# Patient Record
Sex: MALE | Race: WHITE | NOT HISPANIC OR LATINO | Employment: OTHER | URBAN - METROPOLITAN AREA
[De-identification: names, ages, dates, MRNs, and addresses within clinical notes are randomized per-mention and may not be internally consistent; named-entity substitution may affect disease eponyms.]

---

## 2024-08-26 ENCOUNTER — HOSPITAL ENCOUNTER (INPATIENT)
Facility: HOSPITAL | Age: 84
LOS: 4 days | Discharge: HOME HEALTH | DRG: 177 | End: 2024-08-30
Attending: EMERGENCY MEDICINE | Admitting: INTERNAL MEDICINE
Payer: MEDICARE

## 2024-08-26 ENCOUNTER — APPOINTMENT (OUTPATIENT)
Dept: RADIOLOGY | Facility: HOSPITAL | Age: 84
End: 2024-08-26
Attending: EMERGENCY MEDICINE
Payer: MEDICARE

## 2024-08-26 DIAGNOSIS — R06.02 SOB (SHORTNESS OF BREATH): ICD-10-CM

## 2024-08-26 DIAGNOSIS — R91.1 LUNG NODULE: ICD-10-CM

## 2024-08-26 DIAGNOSIS — U07.1 COVID-19 VIRUS INFECTION: Primary | ICD-10-CM

## 2024-08-26 DIAGNOSIS — R09.02 HYPOXIA: ICD-10-CM

## 2024-08-26 DIAGNOSIS — J44.1 COPD EXACERBATION: ICD-10-CM

## 2024-08-26 DIAGNOSIS — R07.9 CHEST PAIN: ICD-10-CM

## 2024-08-26 LAB
ALBUMIN SERPL-MCNC: 3.5 G/DL (ref 3.4–4.8)
ALBUMIN/GLOB SERPL: 0.9 RATIO (ref 1.1–2)
ALP SERPL-CCNC: 78 UNIT/L (ref 40–150)
ALT SERPL-CCNC: 113 UNIT/L (ref 0–55)
ANION GAP SERPL CALC-SCNC: 12 MEQ/L
AST SERPL-CCNC: 97 UNIT/L (ref 5–34)
BASOPHILS # BLD AUTO: 0.01 X10(3)/MCL
BASOPHILS NFR BLD AUTO: 0.2 %
BILIRUB SERPL-MCNC: 1 MG/DL
BNP BLD-MCNC: 69.3 PG/ML
BUN SERPL-MCNC: 16.3 MG/DL (ref 8.4–25.7)
CALCIUM SERPL-MCNC: 9.4 MG/DL (ref 8.8–10)
CHLORIDE SERPL-SCNC: 100 MMOL/L (ref 98–107)
CO2 SERPL-SCNC: 20 MMOL/L (ref 23–31)
CREAT SERPL-MCNC: 0.89 MG/DL (ref 0.73–1.18)
CREAT/UREA NIT SERPL: 18
CRP SERPL-MCNC: 67.4 MG/L
EOSINOPHIL # BLD AUTO: 0 X10(3)/MCL (ref 0–0.9)
EOSINOPHIL NFR BLD AUTO: 0 %
ERYTHROCYTE [DISTWIDTH] IN BLOOD BY AUTOMATED COUNT: 12.7 % (ref 11.5–17)
GFR SERPLBLD CREATININE-BSD FMLA CKD-EPI: >60 ML/MIN/1.73/M2
GLOBULIN SER-MCNC: 3.8 GM/DL (ref 2.4–3.5)
GLUCOSE SERPL-MCNC: 92 MG/DL (ref 82–115)
HCT VFR BLD AUTO: 44.3 % (ref 42–52)
HGB BLD-MCNC: 16 G/DL (ref 14–18)
IMM GRANULOCYTES # BLD AUTO: 0.02 X10(3)/MCL (ref 0–0.04)
IMM GRANULOCYTES NFR BLD AUTO: 0.4 %
LYMPHOCYTES # BLD AUTO: 0.67 X10(3)/MCL (ref 0.6–4.6)
LYMPHOCYTES NFR BLD AUTO: 15 %
MAGNESIUM SERPL-MCNC: 1.7 MG/DL (ref 1.6–2.6)
MCH RBC QN AUTO: 36 PG (ref 27–31)
MCHC RBC AUTO-ENTMCNC: 36.1 G/DL (ref 33–36)
MCV RBC AUTO: 99.6 FL (ref 80–94)
MONOCYTES # BLD AUTO: 0.5 X10(3)/MCL (ref 0.1–1.3)
MONOCYTES NFR BLD AUTO: 11.2 %
NEUTROPHILS # BLD AUTO: 3.26 X10(3)/MCL (ref 2.1–9.2)
NEUTROPHILS NFR BLD AUTO: 73.2 %
NRBC BLD AUTO-RTO: 0 %
PLATELET # BLD AUTO: 158 X10(3)/MCL (ref 130–400)
PMV BLD AUTO: 10.7 FL (ref 7.4–10.4)
POTASSIUM SERPL-SCNC: 4.4 MMOL/L (ref 3.5–5.1)
PROT SERPL-MCNC: 7.3 GM/DL (ref 5.8–7.6)
RBC # BLD AUTO: 4.45 X10(6)/MCL (ref 4.7–6.1)
SODIUM SERPL-SCNC: 132 MMOL/L (ref 136–145)
TROPONIN I SERPL-MCNC: 0.02 NG/ML (ref 0–0.04)
WBC # BLD AUTO: 4.46 X10(3)/MCL (ref 4.5–11.5)

## 2024-08-26 PROCEDURE — 85379 FIBRIN DEGRADATION QUANT: CPT | Performed by: INTERNAL MEDICINE

## 2024-08-26 PROCEDURE — 25000003 PHARM REV CODE 250: Performed by: EMERGENCY MEDICINE

## 2024-08-26 PROCEDURE — 25500020 PHARM REV CODE 255: Performed by: EMERGENCY MEDICINE

## 2024-08-26 PROCEDURE — 94760 N-INVAS EAR/PLS OXIMETRY 1: CPT

## 2024-08-26 PROCEDURE — 11000001 HC ACUTE MED/SURG PRIVATE ROOM

## 2024-08-26 PROCEDURE — 96374 THER/PROPH/DIAG INJ IV PUSH: CPT

## 2024-08-26 PROCEDURE — 94640 AIRWAY INHALATION TREATMENT: CPT

## 2024-08-26 PROCEDURE — 99900035 HC TECH TIME PER 15 MIN (STAT)

## 2024-08-26 PROCEDURE — 99900031 HC PATIENT EDUCATION (STAT)

## 2024-08-26 PROCEDURE — 80053 COMPREHEN METABOLIC PANEL: CPT

## 2024-08-26 PROCEDURE — 27000221 HC OXYGEN, UP TO 24 HOURS

## 2024-08-26 PROCEDURE — 93005 ELECTROCARDIOGRAM TRACING: CPT

## 2024-08-26 PROCEDURE — 86140 C-REACTIVE PROTEIN: CPT | Performed by: INTERNAL MEDICINE

## 2024-08-26 PROCEDURE — 25000242 PHARM REV CODE 250 ALT 637 W/ HCPCS: Performed by: EMERGENCY MEDICINE

## 2024-08-26 PROCEDURE — 63600175 PHARM REV CODE 636 W HCPCS: Mod: JZ,TB | Performed by: EMERGENCY MEDICINE

## 2024-08-26 PROCEDURE — 83735 ASSAY OF MAGNESIUM: CPT

## 2024-08-26 PROCEDURE — 27000207 HC ISOLATION

## 2024-08-26 PROCEDURE — 85025 COMPLETE CBC W/AUTO DIFF WBC: CPT

## 2024-08-26 PROCEDURE — 71275 CT ANGIOGRAPHY CHEST: CPT | Mod: TC

## 2024-08-26 PROCEDURE — 99285 EMERGENCY DEPT VISIT HI MDM: CPT | Mod: 25

## 2024-08-26 PROCEDURE — 25000003 PHARM REV CODE 250: Performed by: INTERNAL MEDICINE

## 2024-08-26 PROCEDURE — 93010 ELECTROCARDIOGRAM REPORT: CPT | Mod: ,,, | Performed by: INTERNAL MEDICINE

## 2024-08-26 PROCEDURE — XW033E5 INTRODUCTION OF REMDESIVIR ANTI-INFECTIVE INTO PERIPHERAL VEIN, PERCUTANEOUS APPROACH, NEW TECHNOLOGY GROUP 5: ICD-10-PCS | Performed by: EMERGENCY MEDICINE

## 2024-08-26 PROCEDURE — 83880 ASSAY OF NATRIURETIC PEPTIDE: CPT

## 2024-08-26 PROCEDURE — 84484 ASSAY OF TROPONIN QUANT: CPT

## 2024-08-26 RX ORDER — HYDROCODONE BITARTRATE AND ACETAMINOPHEN 10; 325 MG/1; MG/1
1 TABLET ORAL EVERY 8 HOURS PRN
Status: DISCONTINUED | OUTPATIENT
Start: 2024-08-26 | End: 2024-08-30 | Stop reason: HOSPADM

## 2024-08-26 RX ORDER — GUAIFENESIN AND DEXTROMETHORPHAN HYDROBROMIDE 10; 100 MG/5ML; MG/5ML
5 SYRUP ORAL EVERY 4 HOURS PRN
Status: DISCONTINUED | OUTPATIENT
Start: 2024-08-26 | End: 2024-08-30 | Stop reason: HOSPADM

## 2024-08-26 RX ORDER — ALBUTEROL SULFATE 90 UG/1
AEROSOL, METERED RESPIRATORY (INHALATION)
COMMUNITY
Start: 2024-08-21

## 2024-08-26 RX ORDER — DOXYCYCLINE HYCLATE 100 MG
100 TABLET ORAL 2 TIMES DAILY
Status: ON HOLD | COMMUNITY
Start: 2024-08-21 | End: 2024-08-30 | Stop reason: HOSPADM

## 2024-08-26 RX ORDER — POLYETHYLENE GLYCOL 3350 17 G/17G
17 POWDER, FOR SOLUTION ORAL 2 TIMES DAILY PRN
Status: DISCONTINUED | OUTPATIENT
Start: 2024-08-26 | End: 2024-08-30 | Stop reason: HOSPADM

## 2024-08-26 RX ORDER — IPRATROPIUM BROMIDE AND ALBUTEROL SULFATE 2.5; .5 MG/3ML; MG/3ML
3 SOLUTION RESPIRATORY (INHALATION) EVERY 4 HOURS
Status: DISCONTINUED | OUTPATIENT
Start: 2024-08-27 | End: 2024-08-27

## 2024-08-26 RX ORDER — SODIUM CHLORIDE 0.9 % (FLUSH) 0.9 %
10 SYRINGE (ML) INJECTION
Status: DISCONTINUED | OUTPATIENT
Start: 2024-08-26 | End: 2024-08-30 | Stop reason: HOSPADM

## 2024-08-26 RX ORDER — ALUMINUM HYDROXIDE, MAGNESIUM HYDROXIDE, AND SIMETHICONE 1200; 120; 1200 MG/30ML; MG/30ML; MG/30ML
30 SUSPENSION ORAL 4 TIMES DAILY PRN
Status: DISCONTINUED | OUTPATIENT
Start: 2024-08-26 | End: 2024-08-30 | Stop reason: HOSPADM

## 2024-08-26 RX ORDER — ALBUTEROL SULFATE 0.83 MG/ML
7.5 SOLUTION RESPIRATORY (INHALATION)
Status: COMPLETED | OUTPATIENT
Start: 2024-08-27 | End: 2024-08-27

## 2024-08-26 RX ORDER — DEXAMETHASONE SODIUM PHOSPHATE 4 MG/ML
8 INJECTION, SOLUTION INTRA-ARTICULAR; INTRALESIONAL; INTRAMUSCULAR; INTRAVENOUS; SOFT TISSUE
Status: COMPLETED | OUTPATIENT
Start: 2024-08-26 | End: 2024-08-26

## 2024-08-26 RX ORDER — PROCHLORPERAZINE EDISYLATE 5 MG/ML
5 INJECTION INTRAMUSCULAR; INTRAVENOUS EVERY 6 HOURS PRN
Status: DISCONTINUED | OUTPATIENT
Start: 2024-08-26 | End: 2024-08-30 | Stop reason: HOSPADM

## 2024-08-26 RX ORDER — HYDROCODONE BITARTRATE AND ACETAMINOPHEN 10; 325 MG/1; MG/1
1 TABLET ORAL 2 TIMES DAILY
COMMUNITY
Start: 2024-08-06

## 2024-08-26 RX ORDER — IPRATROPIUM BROMIDE AND ALBUTEROL SULFATE 2.5; .5 MG/3ML; MG/3ML
3 SOLUTION RESPIRATORY (INHALATION)
Status: COMPLETED | OUTPATIENT
Start: 2024-08-26 | End: 2024-08-26

## 2024-08-26 RX ORDER — DEXAMETHASONE SODIUM PHOSPHATE 4 MG/ML
8 INJECTION, SOLUTION INTRA-ARTICULAR; INTRALESIONAL; INTRAMUSCULAR; INTRAVENOUS; SOFT TISSUE EVERY 24 HOURS
Status: DISCONTINUED | OUTPATIENT
Start: 2024-08-27 | End: 2024-08-28

## 2024-08-26 RX ORDER — TALC
6 POWDER (GRAM) TOPICAL NIGHTLY PRN
Status: DISCONTINUED | OUTPATIENT
Start: 2024-08-26 | End: 2024-08-30 | Stop reason: HOSPADM

## 2024-08-26 RX ORDER — AMOXICILLIN 250 MG
2 CAPSULE ORAL 2 TIMES DAILY PRN
Status: DISCONTINUED | OUTPATIENT
Start: 2024-08-26 | End: 2024-08-30 | Stop reason: HOSPADM

## 2024-08-26 RX ORDER — ACETAMINOPHEN 325 MG/1
650 TABLET ORAL EVERY 4 HOURS PRN
Status: DISCONTINUED | OUTPATIENT
Start: 2024-08-26 | End: 2024-08-30 | Stop reason: HOSPADM

## 2024-08-26 RX ORDER — ONDANSETRON HYDROCHLORIDE 2 MG/ML
4 INJECTION, SOLUTION INTRAVENOUS EVERY 4 HOURS PRN
Status: DISCONTINUED | OUTPATIENT
Start: 2024-08-26 | End: 2024-08-30 | Stop reason: HOSPADM

## 2024-08-26 RX ORDER — METOPROLOL SUCCINATE 50 MG/1
100 TABLET, EXTENDED RELEASE ORAL DAILY
Status: DISCONTINUED | OUTPATIENT
Start: 2024-08-27 | End: 2024-08-30 | Stop reason: HOSPADM

## 2024-08-26 RX ORDER — PREDNISONE 20 MG/1
TABLET ORAL
Status: ON HOLD | COMMUNITY
Start: 2024-08-21 | End: 2024-08-30 | Stop reason: HOSPADM

## 2024-08-26 RX ORDER — BENZONATATE 100 MG/1
200 CAPSULE ORAL 3 TIMES DAILY PRN
Status: DISCONTINUED | OUTPATIENT
Start: 2024-08-26 | End: 2024-08-30 | Stop reason: HOSPADM

## 2024-08-26 RX ORDER — GUAIFENESIN 600 MG/1
600 TABLET, EXTENDED RELEASE ORAL 2 TIMES DAILY
Status: DISCONTINUED | OUTPATIENT
Start: 2024-08-26 | End: 2024-08-30 | Stop reason: HOSPADM

## 2024-08-26 RX ORDER — METOPROLOL SUCCINATE 100 MG/1
100 TABLET, EXTENDED RELEASE ORAL
COMMUNITY
Start: 2024-07-08

## 2024-08-26 RX ORDER — MAGNESIUM SULFATE HEPTAHYDRATE 40 MG/ML
2 INJECTION, SOLUTION INTRAVENOUS ONCE
Status: COMPLETED | OUTPATIENT
Start: 2024-08-27 | End: 2024-08-27

## 2024-08-26 RX ADMIN — GUAIFENESIN 600 MG: 600 TABLET, EXTENDED RELEASE ORAL at 11:08

## 2024-08-26 RX ADMIN — IPRATROPIUM BROMIDE AND ALBUTEROL SULFATE 3 ML: 2.5; .5 SOLUTION RESPIRATORY (INHALATION) at 11:08

## 2024-08-26 RX ADMIN — IPRATROPIUM BROMIDE AND ALBUTEROL SULFATE 3 ML: .5; 3 SOLUTION RESPIRATORY (INHALATION) at 09:08

## 2024-08-26 RX ADMIN — DEXAMETHASONE SODIUM PHOSPHATE 8 MG: 4 INJECTION, SOLUTION INTRA-ARTICULAR; INTRALESIONAL; INTRAMUSCULAR; INTRAVENOUS; SOFT TISSUE at 10:08

## 2024-08-26 RX ADMIN — IOHEXOL 100 ML: 350 INJECTION, SOLUTION INTRAVENOUS at 08:08

## 2024-08-26 RX ADMIN — REMDESIVIR 200 MG: 100 INJECTION, POWDER, LYOPHILIZED, FOR SOLUTION INTRAVENOUS at 11:08

## 2024-08-26 RX ADMIN — APIXABAN 5 MG: 5 TABLET, FILM COATED ORAL at 11:08

## 2024-08-26 NOTE — FIRST PROVIDER EVALUATION
"Medical screening examination initiated.  I have conducted a focused provider triage encounter, findings are as follows:    Brief history of present illness:  84 year old male presents to ER with c/o SOB and body aches. Patient diagnosed with COVID and Pneumonia last week    Vitals:    08/26/24 1715 08/26/24 1719   BP: 109/71    Pulse: (!) 115    Resp: (!) 24    Temp: 98 °F (36.7 °C)    TempSrc: Oral    SpO2: (!) 88% (!) 94%   Weight: 86.2 kg (190 lb)    Height: 6' 2" (1.88 m)        Pertinent physical exam:  Awake and alert, NAD    Brief workup plan:  Labs, EKG, CXR    Preliminary workup initiated; this workup will be continued and followed by the physician or advanced practice provider that is assigned to the patient when roomed.  "

## 2024-08-27 LAB
D DIMER PPP IA.FEU-MCNC: <0.27 UG/ML FEU (ref 0–0.5)
OHS QRS DURATION: 70 MS
OHS QTC CALCULATION: 395 MS
POCT GLUCOSE: 104 MG/DL (ref 70–110)

## 2024-08-27 PROCEDURE — 27000207 HC ISOLATION

## 2024-08-27 PROCEDURE — 94760 N-INVAS EAR/PLS OXIMETRY 1: CPT

## 2024-08-27 PROCEDURE — 25000242 PHARM REV CODE 250 ALT 637 W/ HCPCS: Performed by: INTERNAL MEDICINE

## 2024-08-27 PROCEDURE — 25000003 PHARM REV CODE 250: Performed by: INTERNAL MEDICINE

## 2024-08-27 PROCEDURE — 94799 UNLISTED PULMONARY SVC/PX: CPT

## 2024-08-27 PROCEDURE — 99900031 HC PATIENT EDUCATION (STAT)

## 2024-08-27 PROCEDURE — 27000221 HC OXYGEN, UP TO 24 HOURS

## 2024-08-27 PROCEDURE — 25000003 PHARM REV CODE 250: Performed by: EMERGENCY MEDICINE

## 2024-08-27 PROCEDURE — 21400001 HC TELEMETRY ROOM

## 2024-08-27 PROCEDURE — 94640 AIRWAY INHALATION TREATMENT: CPT

## 2024-08-27 PROCEDURE — 63600175 PHARM REV CODE 636 W HCPCS: Performed by: EMERGENCY MEDICINE

## 2024-08-27 PROCEDURE — 25000242 PHARM REV CODE 250 ALT 637 W/ HCPCS: Performed by: EMERGENCY MEDICINE

## 2024-08-27 PROCEDURE — 63600175 PHARM REV CODE 636 W HCPCS: Mod: JZ,TB | Performed by: EMERGENCY MEDICINE

## 2024-08-27 PROCEDURE — 99900035 HC TECH TIME PER 15 MIN (STAT)

## 2024-08-27 PROCEDURE — 82962 GLUCOSE BLOOD TEST: CPT

## 2024-08-27 PROCEDURE — 63600175 PHARM REV CODE 636 W HCPCS: Performed by: INTERNAL MEDICINE

## 2024-08-27 RX ORDER — METOPROLOL TARTRATE 1 MG/ML
5 INJECTION, SOLUTION INTRAVENOUS EVERY 5 MIN PRN
Status: DISCONTINUED | OUTPATIENT
Start: 2024-08-27 | End: 2024-08-30 | Stop reason: HOSPADM

## 2024-08-27 RX ORDER — IPRATROPIUM BROMIDE AND ALBUTEROL SULFATE 2.5; .5 MG/3ML; MG/3ML
3 SOLUTION RESPIRATORY (INHALATION)
Status: DISCONTINUED | OUTPATIENT
Start: 2024-08-27 | End: 2024-08-27

## 2024-08-27 RX ORDER — BUDESONIDE 0.5 MG/2ML
0.5 INHALANT ORAL EVERY 12 HOURS
Status: DISCONTINUED | OUTPATIENT
Start: 2024-08-27 | End: 2024-08-28

## 2024-08-27 RX ADMIN — ALBUTEROL SULFATE 5 MG: 2.5 SOLUTION RESPIRATORY (INHALATION) at 12:08

## 2024-08-27 RX ADMIN — IPRATROPIUM BROMIDE AND ALBUTEROL SULFATE 3 ML: 2.5; .5 SOLUTION RESPIRATORY (INHALATION) at 04:08

## 2024-08-27 RX ADMIN — DEXAMETHASONE SODIUM PHOSPHATE 8 MG: 4 INJECTION, SOLUTION INTRA-ARTICULAR; INTRALESIONAL; INTRAMUSCULAR; INTRAVENOUS; SOFT TISSUE at 09:08

## 2024-08-27 RX ADMIN — GUAIFENESIN AND DEXTROMETHORPHAN 5 ML: 100; 10 SYRUP ORAL at 01:08

## 2024-08-27 RX ADMIN — GUAIFENESIN 600 MG: 600 TABLET, EXTENDED RELEASE ORAL at 10:08

## 2024-08-27 RX ADMIN — MAGNESIUM SULFATE HEPTAHYDRATE 2 G: 40 INJECTION, SOLUTION INTRAVENOUS at 12:08

## 2024-08-27 RX ADMIN — BENZONATATE 200 MG: 100 CAPSULE ORAL at 10:08

## 2024-08-27 RX ADMIN — REMDESIVIR 100 MG: 100 INJECTION, POWDER, LYOPHILIZED, FOR SOLUTION INTRAVENOUS at 10:08

## 2024-08-27 RX ADMIN — Medication 6 MG: at 10:08

## 2024-08-27 RX ADMIN — GUAIFENESIN AND DEXTROMETHORPHAN 5 ML: 100; 10 SYRUP ORAL at 10:08

## 2024-08-27 RX ADMIN — IPRATROPIUM BROMIDE AND ALBUTEROL SULFATE 3 ML: 2.5; .5 SOLUTION RESPIRATORY (INHALATION) at 08:08

## 2024-08-27 RX ADMIN — METOPROLOL SUCCINATE 100 MG: 50 TABLET, EXTENDED RELEASE ORAL at 09:08

## 2024-08-27 RX ADMIN — IPRATROPIUM BROMIDE AND ALBUTEROL SULFATE 3 ML: 2.5; .5 SOLUTION RESPIRATORY (INHALATION) at 11:08

## 2024-08-27 RX ADMIN — APIXABAN 5 MG: 5 TABLET, FILM COATED ORAL at 09:08

## 2024-08-27 RX ADMIN — BUDESONIDE 0.5 MG: 0.5 INHALANT RESPIRATORY (INHALATION) at 07:08

## 2024-08-27 RX ADMIN — APIXABAN 5 MG: 5 TABLET, FILM COATED ORAL at 10:08

## 2024-08-27 RX ADMIN — GUAIFENESIN 600 MG: 600 TABLET, EXTENDED RELEASE ORAL at 09:08

## 2024-08-27 RX ADMIN — HYDROCODONE BITARTRATE AND ACETAMINOPHEN 1 TABLET: 10; 325 TABLET ORAL at 10:08

## 2024-08-27 NOTE — ED PROVIDER NOTES
Encounter Date: 8/26/2024       History     Chief Complaint   Patient presents with    Shortness of Breath     And body aches x 1 week, diagnosed with COVID/PNA at [place] last week on abx, steroids, and paxlovid     84-year-old male presents to the emergency department for evaluation worsening body aches for the last 1 week, shortness of breath.  Diagnosed with COVID-19, pneumonia at [place] last week, on antibiotics and steroids outpatient but symptoms not improving.  Family member at the bedside relates has been checking his oxygen saturation and it was 88% at home prior to arrival.  States they have been trying to get him to come to the emergency department; however, he has been stubborn and refusing to come    The history is provided by the patient and a relative.     Review of patient's allergies indicates:  No Known Allergies  Past Medical History:   Diagnosis Date    Cancer     Hypertension     Paroxysmal atrial fibrillation      History reviewed. No pertinent surgical history.  No family history on file.  Social History     Tobacco Use    Smoking status: Every Day     Current packs/day: 0.25     Types: Cigarettes    Smokeless tobacco: Never     Review of Systems   Constitutional:  Positive for fatigue. Negative for fever.   Respiratory:  Positive for cough and shortness of breath.    Cardiovascular:  Negative for leg swelling.   Gastrointestinal:  Negative for nausea and vomiting.   Musculoskeletal:  Positive for myalgias.       Physical Exam     Initial Vitals [08/26/24 1715]   BP Pulse Resp Temp SpO2   109/71 (!) 115 (!) 24 98 °F (36.7 °C) (!) 88 %      MAP       --         Physical Exam    Nursing note and vitals reviewed.  Constitutional: He appears well-developed and well-nourished. No distress.   HENT:   Head: Normocephalic and atraumatic.   Mouth/Throat: Oropharynx is clear and moist.   Eyes: No scleral icterus.   Neck: Neck supple.   Normal range of motion.  Cardiovascular:  An irregularly  irregular rhythm present.   Tachycardia present.         Pulmonary/Chest: No respiratory distress. He has wheezes. He has rhonchi.   Abdominal: Abdomen is soft. He exhibits no distension. There is no abdominal tenderness.   Musculoskeletal:      Cervical back: Normal range of motion and neck supple.     Neurological: He is alert and oriented to person, place, and time. GCS score is 15.   Skin: Skin is warm and dry.         ED Course   Procedures  Labs Reviewed   COMPREHENSIVE METABOLIC PANEL - Abnormal       Result Value    Sodium 132 (*)     Potassium 4.4      Chloride 100      CO2 20 (*)     Glucose 92      Blood Urea Nitrogen 16.3      Creatinine 0.89      Calcium 9.4      Protein Total 7.3      Albumin 3.5      Globulin 3.8 (*)     Albumin/Globulin Ratio 0.9 (*)     Bilirubin Total 1.0      ALP 78       (*)     AST 97 (*)     eGFR >60      Anion Gap 12.0      BUN/Creatinine Ratio 18     CBC WITH DIFFERENTIAL - Abnormal    WBC 4.46 (*)     RBC 4.45 (*)     Hgb 16.0      Hct 44.3      MCV 99.6 (*)     MCH 36.0 (*)     MCHC 36.1 (*)     RDW 12.7      Platelet 158      MPV 10.7 (*)     Neut % 73.2      Lymph % 15.0      Mono % 11.2      Eos % 0.0      Basophil % 0.2      Lymph # 0.67      Neut # 3.26      Mono # 0.50      Eos # 0.00      Baso # 0.01      IG# 0.02      IG% 0.4      NRBC% 0.0     B-TYPE NATRIURETIC PEPTIDE - Normal    Natriuretic Peptide 69.3     TROPONIN I - Normal    Troponin-I 0.019     MAGNESIUM - Normal    Magnesium Level 1.70     CBC W/ AUTO DIFFERENTIAL    Narrative:     The following orders were created for panel order CBC auto differential.  Procedure                               Abnormality         Status                     ---------                               -----------         ------                     CBC with Differential[[id number]]       Abnormal            Final result                 Please view results for these tests on the individual orders.   COVID/RSV/FLU A&B PCR      EKG Readings: (Independently Interpreted)   Rhythm: Atrial Fibrillation. Heart Rate: 103. Clinical Impression: Atrial Fibrillation   08/26/2024 @ 1710       Imaging Results              CTA Chest Non-Coronary (PE Studies) (Final result)  Result time 08/26/24 20:16:07      Final result by Tanner Kerns MD (08/26/24 20:16:07)                   Impression:      No evidence of pulmonary thromboembolism.    Left lower lobe bronchiectasis with some trace ground-glass opacity. Early infectious process not entirely excluded. Mild tree in bud opacity.  Follow-up may be obtained within 3 months.      Electronically signed by: Tanner Kerns  Date:    08/26/2024  Time:    20:16               Narrative:    EXAMINATION:  CTA CHEST NON CORONARY (PE STUDIES)    CLINICAL HISTORY:  Pulmonary embolism (PE) suspected, unknown D-dimer;    TECHNIQUE:  Axial images of the chest were obtained with contrast. Sagittal and coronal reconstructed images were available for review. 3-D MIP reconstructions were performed from source imaging.    Automatic dose control was utilized to reduce patient radiation dose.    DLP= 510    COMPARISON:  No prior imaging available for comparison.    FINDINGS:  PULMONARY ARTERY: No evidence of pulmonary thromboembolism.    AORTA: Normal in course and caliber.  Scattered atherosclerotic disease is noted.    THYROID GLAND: The visualized thyroid is unremarkable.    AIRWAYS: Left lower lobe bronchiectasis with some trace ground-glass opacity.  Early infectious process not entirely excluded.  Mild tree in bud opacity.    HEART: The heart is normal in size. There is no pericardial effusion.    LUNGS: There are no new masses or nodules identified. No pleural effusion or pneumothorax.    LYMPH NODES: There is no significant mediastinal, axillary or hilar lymphadenopathy.    BONES: No displaced fracture. No aggressive appearing osseous lesion identified.    UPPER ABDOMEN:  The visualized abdomen is  unremarkable.                                         X-Ray Chest AP Portable (Final result)  Result time 08/26/24 17:42:36      Final result by Marli Addison MD (08/26/24 17:42:36)                   Impression:      Lung nodule seen in the right upper lobe.  CT scan chest is recommended.      Electronically signed by: Dave Addison  Date:    08/26/2024  Time:    17:42               Narrative:    EXAMINATION:  XR CHEST AP PORTABLE    CLINICAL HISTORY:  Shortness of breath    TECHNIQUE:  Single frontal view of the chest was performed.    COMPARISON:  09/02/2014    FINDINGS:  There are a couple of a lung nodule seen in the right upper hemithorax.  The lungs are hypoaerated.  No pleural effusion is seen.  The heart is normal appearance.  The aorta is slightly ectatic.  Bones and joints show no acute abnormality.                                       Medications   dexAMETHasone injection 8 mg (has no administration in time range)   remdesivir 200 mg in 0.9% NaCl 250 mL infusion (has no administration in time range)     Followed by   remdesivir 100 mg in 0.9% NaCl 100 mL infusion (has no administration in time range)   iohexoL (OMNIPAQUE 350) injection 100 mL (100 mLs Intravenous Given 8/26/24 2008)   albuterol-ipratropium 2.5 mg-0.5 mg/3 mL nebulizer solution 3 mL (3 mLs Nebulization Given 8/26/24 2135)     Medical Decision Making  Problems Addressed:  COVID-19 virus infection: acute illness or injury  Hypoxia: acute illness or injury  Lung nodule: undiagnosed new problem with uncertain prognosis  SOB (shortness of breath): acute illness or injury    Amount and/or Complexity of Data Reviewed  Radiology: ordered.    Risk  Prescription drug management.  Decision regarding hospitalization.      ED assessment:    Mr. Han presented for evaluation of increased shortness of breath, generalized weakness in the setting of COVID-19 infection, diagnosed late last week.  Afebrile however hypoxic on ED arrival.   Extensive rhonchi and wheezing noted.  Placed on supplemental O2 with improvement.  Neb treatment ordered.    Differential diagnosis (including but not limited to):   COVID-19 virus infection, secondary bacterial pneumonia, pulmonary embolus, reactive airway disease, bronchitis, sepsis    ED management:   Chest x-ray with pulmonary nodule otherwise no acute findings.  CT scan obtained demonstrates left lower lobe bronchiectasis with some ground-glass opacity.  Laboratory studies unremarkable.  Still requiring supplemental O2 after neb treatment.  Discussed with hospitalist who accepts for admission.  Started on dexamethasone and remdesivir.    My independent radiology interpretation:   Chest x-ray: No focal infiltrate or effusion    Amount and/or Complexity of Data Reviewed  Independent historian: daughter    Summary of history:  Daughter at bedside provided majority of history as patient hard of hearing and deferred to her for questions.  External data reviewed: notes from previous ED visits  Summary of data reviewed:  Only previous emergency department visit in recent years was related to enteritis in 2020.  No visits or encounters from last several years  Risk and benefits of testing: discussed   Labs: ordered and reviewed  Radiology: ordered and independent interpretation performed (see above or ED course)  ECG/medicine tests: ordered and independent interpretation performed (see above or ED course)  Discussion of management or test interpretation with external provider(s): discussed with hospitalist physician   Summary of discussion:  Discussed with hospitalist NP who accepts for admit    Risk  Prescription drug management   Decision regarding hospitalization  Shared decision making     Critical Care  none    I, Nette Davis MD personally performed the history, PE, MDM, and procedures as documented above and agree with the scribe's documentation.              ED Course as of 08/26/24 2237   Mon Aug 26, 2024    4519 Paged hospitalist. [RB]      ED Course User Index  [RB] Megan Saldaña                           Clinical Impression:  Final diagnoses:  [R06.02] SOB (shortness of breath)  [U07.1] COVID-19 virus infection (Primary)  [R09.02] Hypoxia  [R91.1] Lung nodule          ED Disposition Condition    Admit Stable                Nette Davis MD  08/26/24 [id number]

## 2024-08-27 NOTE — NURSING
Admission documentation completed by the admit nurse. Home med rec complete. Pt did elect for meds to bed with [facility]. 4 Eyes to be completed by the admitting floor nurse.

## 2024-08-27 NOTE — PROGRESS NOTES
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      Patient was in his usual state of health until about 1 week ago started having runny nose, nonproductive cough, fatigue, lethargy, decreased appetite.  He was seen by urgent care on 08/21/2024 and diagnosed with COVID. He  was prescribed doxycycline, prednisone, and Paxlovid. His symptoms did not improve and daughter checked his pulse ox today and was in the 80s for which he was brought to the ED. He denies fever, chills, chest pain, diarrhea.     On arrival to ED he was afebrile, tachycardic, tachypneic, normotensive and saturating 88% on room air, improved to above 94% on 4 L nasal cannula..  EKG showed atrial fibrillation with CVR. Labs with WBC 4.46, hemoglobin 16.0, platelets 158, creatinine 0.89, sodium 132, CRP 67.4, normal D-dimer, normal BNP and unremarkable troponin.  CTA chest with neg PE, left lower lobe bronchiectasis with  trace ground-glass opacity, mild tree in bud opacity suggestive of infectious process. No masses, nodules or lymphadenopathy appreciated. Pt was admitted under  service for further management.       Interval Hx:   Pt was seen at bedside with daughter in the room. Currently he is resting comfortably and no distress noted. O2 via Oxymask and down to 2L/min. SpO2 94 to 95%.  Afebrile. Hemodynamics are stable     No AM labs today to review.     Case was discussed with patient's nurse and  on the floor.    Objective/physical exam:  General: In no acute distress, afebrile, O2 via Oxy mask 2L  Chest: slightly coarse sounds anteriorly with end exp wheezes  Heart: RRR, +S1,  S2, no appreciable murmur  Abdomen: Soft, nontender, BS +  MSK: Warm, no lower extremity edema, no clubbing or cyanosis  Neurologic:Awake and oriented       VITAL SIGNS: 24 HRS MIN & MAX LAST   Temp  Min: 97.2 °F (36.2 °C)  Max: 98.1 °F (36.7 °C) 97.2 °F (36.2 °C)   BP  Min: 100/70  Max: 137/86 100/70   Pulse  Min: 84  Max: 115  93   Resp  Min: 17  Max: 27 20   SpO2  Min: 88 %  Max: 100 % (!) 94 %     I have reviewed the following labs:  Recent Labs   Lab 08/26/24  1752   WBC 4.46*   RBC 4.45*   HGB 16.0   HCT 44.3   MCV 99.6*   MCH 36.0*   MCHC 36.1*   RDW 12.7      MPV 10.7*     Recent Labs   Lab 08/26/24  1752   *   K 4.4      CO2 20*   BUN 16.3   CREATININE 0.89   CALCIUM 9.4   MG 1.70   ALBUMIN 3.5   ALKPHOS 78   *   AST 97*   BILITOT 1.0     Microbiology Results (last 7 days)       ** No results found for the last 168 hours. **             See below for Radiology    Assessment/Plan:  COVID 19 virus infection and pneumonia involving left lower lobe, POA  COPD with acute exacerbation   Acute hypoxic respiratory failure due to above   Left lung bronchiectasis   Hyponatremia , mild -POA  Metabolic acidosis , POA  Elevated transaminases , POA  Elevated inflammatory markers in the setting of COVID infection   Physical deconditioning/ debility     Hx-HTN, HLD, PAF on Eliquis, GERD, OA, Opiate dependency    Plan-   Continue supplemental oxygen and wean as tolerated to keep SpO2>90%  Remdesivir therapeutic course initiated along with Decadron.   Inhaled bronchodilators and ICS   Supportive treatment with antitussive and expectorant   Pt has completed oral Doxycyline as outpatient   Home meds are reviewed and resumed   Monitor LFTs. Get Hepatitis panel  PT/OT consult     Critical care note:  Critical care diagnosis: COVID 19 infection requiring treatment   Critical care interventions: Hands-on evaluation, review of labs/radiographs/records and discussion with patient and family if  present  Critical care time spent: 35 minutes        VTE prophylaxis: Eliquis     Patient condition:  Fair     Anticipated discharge and Disposition:     TBD    All diagnosis and differential diagnosis have been reviewed; assessment and plan has been documented; I have personally reviewed the labs and test results that are presently available; I have reviewed the patients medication list; I have reviewed the consulting providers response and recommendations. I have reviewed or attempted to review medical records based upon their availability    All of the patient's questions have been  addressed and answered. Patient's is agreeable to the above stated plan. I will continue to monitor closely and make adjustments to medical management as needed.    Portions of this note dictated using EMR integrated voice recognition software, and may be subject to voice recognition errors not corrected at proofreading. Please contact writer for clarification if needed.   _____________________________________________________________________    Malnutrition Status:    Scheduled Med:   albuterol-ipratropium  3 mL Nebulization Q4H    apixaban  5 mg Oral BID    dexAMETHasone  8 mg Intravenous Daily    guaiFENesin  600 mg Oral BID    metoprolol succinate  100 mg Oral Daily    remdesivir infusion  100 mg Intravenous Q24H      Continuous Infusions:     PRN Meds:    Current Facility-Administered Medications:     acetaminophen, 650 mg, Oral, Q4H PRN    aluminum-magnesium hydroxide-simethicone, 30 mL, Oral, QID PRN    benzonatate, 200 mg, Oral, TID PRN    dextromethorphan-guaiFENesin  mg/5 ml, 5 mL, Oral, Q4H PRN    HYDROcodone-acetaminophen, 1 tablet, Oral, Q8H PRN    melatonin, 6 mg, Oral, Nightly PRN    metoprolol, 5 mg, Intravenous, Q5 Min PRN    ondansetron, 4 mg, Intravenous, Q4H PRN    polyethylene glycol, 17 g, Oral, BID PRN    prochlorperazine, 5 mg, Intravenous, Q6H PRN    senna-docusate 8.6-50 mg, 2 tablet, Oral, BID PRN     sodium chloride 0.9%, 10 mL, Intravenous, PRN         Vivien Santos MD  Department of Hospital Medicine   [facility]   08/27/2024

## 2024-08-27 NOTE — H&P
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    Patient was in his usual state of health until about 1 week ago started having runny nose, nonproductive cough, fatigue, lethargy, decreased appetite.  He was seen by urgent care on 08/21/2024 and was prescribed doxycycline, prednisone, Paxlovid.  His symptom did not improve and daughter checked his pulse ox today and was in the 80s for which he was brought to the ED. He denies fever, chills, chest pain, diarrhea.    On arrival to ED he was afebrile, tachycardic, tachypneic, normotensive and saturating 88% on room air, improved to above 94% on 4 L nasal cannula..  EKG showed atrial fibrillation with controlled ventricular response.  Labs notable for WBC 4.46, hemoglobin 16.0, platelets 158, creatinine 0.89, sodium 132, CRP 67.4, normal D-dimer, normal BNP and unremarkable troponin.  CTA chest radiology read no pulmonary embolism and there is left lower lobe bronchiectasis with some trace ground-glass opacities.  On my independent CT image interpretation there is significant bilateral emphysematous changes as well as bilateral lower lobe bronchiectatic changes and scattered ground-glass opacities    ROS   Except as documented, all other systems reviewed and negative     Past Medical History   Paroxysmal AFib on Eliquis  CAD-details unavailable  Unquantified  COPD  HTN  HLD  Hearing loss  SCC nose -resected    Surgical History   SSC of nose status post resection  Dentures    Social History     Social History     Tobacco Use    Smoking status: Every Day     Current packs/day: 0.25     Types: Cigarettes    Smokeless tobacco: Never   Substance Use Topics    Alcohol use: Not on file        Screening for social drivers of health:  Patient screened for food insecurity, housing instability, transportation needs, utility difficulties, and interpersonal safety:  []Housing or food  []Transportation needs  []Utility difficulties  []Interpersonal safety  [x]None    Family History   Reviewed and negative    Allergies   Patient has no known allergies.    Home Medications     Prior to Admission medications    Medication Sig Start Date End Date Taking? Authorizing Provider   apixaban (ELIQUIS) 5 mg Tab Take 5 mg by mouth 2 (two) times daily.   Yes Provider, Historical   doxycycline (VIBRA-TABS) 100 MG tablet Take 100 mg by mouth 2 (two) times daily. as directed. 8/21/24  Yes Provider, Historical   HYDROcodone-acetaminophen (NORCO)  mg per tablet Take 1 tablet by mouth 2 (two) times daily. 8/6/24  Yes Provider, Historical   metoprolol succinate (TOPROL-XL) 100 MG 24 hr tablet Take 100 mg by mouth. 7/8/24  Yes Provider, Historical   predniSONE (DELTASONE) 20 MG tablet TAKE 1 TABLET BY MOUTH EVERY MORNING WITH FOOD AS DIRECTED 8/21/24  Yes Provider, Historical   VENTOLIN HFA 90 mcg/actuation inhaler INHALE 2 PUFFS EVERY 4 HOURS WHILE AWAKE AS NEEDED FOR SHORTNESS OF BREATH OR COUGHING 8/21/24  Yes Provider, Historical        Physical Exam   Vital Signs  Temp:  [98 °F (36.7 °C)]   Pulse:  []   Resp:  [20-27]   BP: (109-133)/(71-88)   SpO2:  [88 %-97 %]    General: Appears comfortable  HEENT: NC/AT, dry skin and oral mucosa  Neck:  No JVD  Chest:  Tachy rate air entry bilaterally with diffuse expiratory wheezing as well as some rhonchi  CVS: Irregular rhythm. Normal S1/S2.  No  pedal edema  Abdomen: nondistended, normoactive BS, soft and non-tender.  MSK: No obvious deformity or joint swelling  Skin: Warm and dry  Neuro:  Arousable, oriented to self, person place and time, following commands, no focal extremity weakness appreciated  Psych: Cooperative    Labs     Recent Labs     08/26/24  1752   WBC 4.46*   RBC 4.45*   HGB 16.0   HCT 44.3   MCV 99.6*   MCH 36.0*   MCHC 36.1*   RDW 12.7        Recent Labs     08/26/24  1752   CRP 67.40*     Recent Labs     08/26/24  2302   D-DIMER <0.27      Recent Labs     08/26/24  1752   *   K 4.4   CO2 20*   BUN 16.3   CREATININE 0.89   EGFRNORACEVR >60   GLUCOSE 92   CALCIUM 9.4   MG 1.70   ALBUMIN 3.5   GLOBULIN 3.8*   ALKPHOS 78   *   AST 97*   BILITOT 1.0   BNP 69.3     Recent Labs     08/26/24  1752   TROPONINI 0.019           Imaging     CTA Chest Non-Coronary (PE Studies)   Final Result      No evidence of pulmonary thromboembolism.      Left lower lobe bronchiectasis with some trace ground-glass opacity. Early infectious process not entirely excluded. Mild tree in bud opacity.  Follow-up may be obtained within 3 months.         Electronically signed by: Tanner Kerns   Date:    08/26/2024   Time:    20:16      X-Ray Chest AP Portable   Final Result      Lung nodule seen in the right upper lobe.  CT scan chest is recommended.         Electronically signed by: Dave Addison   Date:    08/26/2024   Time:    17:42          Assessment   Acute on chronic hypoxemic respiratory failure  Unquantified COPD with acute exacerbation  COVID-19 pneumonitis  Paroxysmal AFib-CVR  Physical deconditioning    History of active chronic tobacco smoking, HTN, HLD, GERD, hearing loss, osteoarthritis/chronic pain/opiate dependent-Norco 10 b.i.d.    Plan   Airborne isolation  Currently maintaining oxygenation with nasal cannula with reasonable work of breathing  Remdesivir for 5 days   Dexamethasone 8 mg IV daily  DuoNeb q.4 hours  Mucinex b.i.d. and  PRN antitussives  Resume metoprolol succinate 100 mg daily  VTE Prophylaxis:  Resume Eliquis 5 mg b.i.d.     Critical care time > 35 minutes  Critical care diagnosis:  Acute on chronic hypoxemic respiratory failure secondary to CBD exacerbation and COVID-19 pneumonitis    Alicia Maria MD  Internal Medicine  8/26/2024. at 10:54 PM.

## 2024-08-28 LAB
ALBUMIN SERPL-MCNC: 2.9 G/DL (ref 3.4–4.8)
ALBUMIN/GLOB SERPL: 1 RATIO (ref 1.1–2)
ALP SERPL-CCNC: 70 UNIT/L (ref 40–150)
ALT SERPL-CCNC: 103 UNIT/L (ref 0–55)
ANION GAP SERPL CALC-SCNC: 10 MEQ/L
AST SERPL-CCNC: 82 UNIT/L (ref 5–34)
B PERT.PT PRMT NPH QL NAA+NON-PROBE: NOT DETECTED
BASOPHILS # BLD AUTO: 0.01 X10(3)/MCL
BASOPHILS NFR BLD AUTO: 0.1 %
BILIRUB SERPL-MCNC: 0.9 MG/DL
BUN SERPL-MCNC: 19 MG/DL (ref 8.4–25.7)
C PNEUM DNA NPH QL NAA+NON-PROBE: NOT DETECTED
CALCIUM SERPL-MCNC: 8.2 MG/DL (ref 8.8–10)
CHLORIDE SERPL-SCNC: 102 MMOL/L (ref 98–107)
CO2 SERPL-SCNC: 21 MMOL/L (ref 23–31)
CREAT SERPL-MCNC: 0.76 MG/DL (ref 0.73–1.18)
CREAT/UREA NIT SERPL: 25
EOSINOPHIL # BLD AUTO: 0 X10(3)/MCL (ref 0–0.9)
EOSINOPHIL NFR BLD AUTO: 0 %
ERYTHROCYTE [DISTWIDTH] IN BLOOD BY AUTOMATED COUNT: 12.3 % (ref 11.5–17)
FLUAV AG UPPER RESP QL IA.RAPID: NOT DETECTED
FLUBV AG UPPER RESP QL IA.RAPID: NOT DETECTED
GFR SERPLBLD CREATININE-BSD FMLA CKD-EPI: >60 ML/MIN/1.73/M2
GLOBULIN SER-MCNC: 2.8 GM/DL (ref 2.4–3.5)
GLUCOSE SERPL-MCNC: 146 MG/DL (ref 82–115)
HADV DNA NPH QL NAA+NON-PROBE: NOT DETECTED
HAV IGM SERPL QL IA: NONREACTIVE
HBV CORE IGM SERPL QL IA: NONREACTIVE
HBV SURFACE AG SERPL QL IA: NONREACTIVE
HCOV 229E RNA NPH QL NAA+NON-PROBE: NOT DETECTED
HCOV HKU1 RNA NPH QL NAA+NON-PROBE: NOT DETECTED
HCOV NL63 RNA NPH QL NAA+NON-PROBE: NOT DETECTED
HCOV OC43 RNA NPH QL NAA+NON-PROBE: NOT DETECTED
HCT VFR BLD AUTO: 39.6 % (ref 42–52)
HCV AB SERPL QL IA: NONREACTIVE
HGB BLD-MCNC: 14.4 G/DL (ref 14–18)
HMPV RNA NPH QL NAA+NON-PROBE: NOT DETECTED
HPIV1 RNA NPH QL NAA+NON-PROBE: NOT DETECTED
HPIV2 RNA NPH QL NAA+NON-PROBE: NOT DETECTED
HPIV3 RNA NPH QL NAA+NON-PROBE: NOT DETECTED
HPIV4 RNA NPH QL NAA+NON-PROBE: NOT DETECTED
IMM GRANULOCYTES # BLD AUTO: 0.04 X10(3)/MCL (ref 0–0.04)
IMM GRANULOCYTES NFR BLD AUTO: 0.6 %
LYMPHOCYTES # BLD AUTO: 0.75 X10(3)/MCL (ref 0.6–4.6)
LYMPHOCYTES NFR BLD AUTO: 10.5 %
M PNEUMO DNA NPH QL NAA+NON-PROBE: NOT DETECTED
MAGNESIUM SERPL-MCNC: 2.2 MG/DL (ref 1.6–2.6)
MCH RBC QN AUTO: 35.6 PG (ref 27–31)
MCHC RBC AUTO-ENTMCNC: 36.4 G/DL (ref 33–36)
MCV RBC AUTO: 97.8 FL (ref 80–94)
MONOCYTES # BLD AUTO: 0.68 X10(3)/MCL (ref 0.1–1.3)
MONOCYTES NFR BLD AUTO: 9.6 %
NEUTROPHILS # BLD AUTO: 5.63 X10(3)/MCL (ref 2.1–9.2)
NEUTROPHILS NFR BLD AUTO: 79.2 %
NRBC BLD AUTO-RTO: 0 %
PHOSPHATE SERPL-MCNC: 3.3 MG/DL (ref 2.3–4.7)
PLATELET # BLD AUTO: 153 X10(3)/MCL (ref 130–400)
PMV BLD AUTO: 10.2 FL (ref 7.4–10.4)
POTASSIUM SERPL-SCNC: 4.6 MMOL/L (ref 3.5–5.1)
PROT SERPL-MCNC: 5.7 GM/DL (ref 5.8–7.6)
RBC # BLD AUTO: 4.05 X10(6)/MCL (ref 4.7–6.1)
RSV A 5' UTR RNA NPH QL NAA+PROBE: NOT DETECTED
RSV RNA NPH QL NAA+NON-PROBE: NOT DETECTED
RV+EV RNA NPH QL NAA+NON-PROBE: NOT DETECTED
SARS-COV-2 RNA RESP QL NAA+PROBE: DETECTED
SODIUM SERPL-SCNC: 133 MMOL/L (ref 136–145)
WBC # BLD AUTO: 7.11 X10(3)/MCL (ref 4.5–11.5)

## 2024-08-28 PROCEDURE — 27000221 HC OXYGEN, UP TO 24 HOURS

## 2024-08-28 PROCEDURE — 97161 PT EVAL LOW COMPLEX 20 MIN: CPT

## 2024-08-28 PROCEDURE — 63600175 PHARM REV CODE 636 W HCPCS: Mod: JZ,TB | Performed by: EMERGENCY MEDICINE

## 2024-08-28 PROCEDURE — 83735 ASSAY OF MAGNESIUM: CPT | Performed by: INTERNAL MEDICINE

## 2024-08-28 PROCEDURE — 87798 DETECT AGENT NOS DNA AMP: CPT | Performed by: INTERNAL MEDICINE

## 2024-08-28 PROCEDURE — 21400001 HC TELEMETRY ROOM

## 2024-08-28 PROCEDURE — 97166 OT EVAL MOD COMPLEX 45 MIN: CPT

## 2024-08-28 PROCEDURE — 25000003 PHARM REV CODE 250: Performed by: EMERGENCY MEDICINE

## 2024-08-28 PROCEDURE — 27000207 HC ISOLATION

## 2024-08-28 PROCEDURE — 25000003 PHARM REV CODE 250: Performed by: INTERNAL MEDICINE

## 2024-08-28 PROCEDURE — 94640 AIRWAY INHALATION TREATMENT: CPT

## 2024-08-28 PROCEDURE — 85025 COMPLETE CBC W/AUTO DIFF WBC: CPT | Performed by: INTERNAL MEDICINE

## 2024-08-28 PROCEDURE — 63600175 PHARM REV CODE 636 W HCPCS: Performed by: INTERNAL MEDICINE

## 2024-08-28 PROCEDURE — 0241U COVID/RSV/FLU A&B PCR: CPT | Performed by: EMERGENCY MEDICINE

## 2024-08-28 PROCEDURE — 25000242 PHARM REV CODE 250 ALT 637 W/ HCPCS: Performed by: INTERNAL MEDICINE

## 2024-08-28 PROCEDURE — 84100 ASSAY OF PHOSPHORUS: CPT | Performed by: INTERNAL MEDICINE

## 2024-08-28 PROCEDURE — 80074 ACUTE HEPATITIS PANEL: CPT | Performed by: INTERNAL MEDICINE

## 2024-08-28 PROCEDURE — 36415 COLL VENOUS BLD VENIPUNCTURE: CPT | Performed by: INTERNAL MEDICINE

## 2024-08-28 PROCEDURE — 99900035 HC TECH TIME PER 15 MIN (STAT)

## 2024-08-28 PROCEDURE — 80053 COMPREHEN METABOLIC PANEL: CPT | Performed by: INTERNAL MEDICINE

## 2024-08-28 PROCEDURE — 99900031 HC PATIENT EDUCATION (STAT)

## 2024-08-28 PROCEDURE — 94760 N-INVAS EAR/PLS OXIMETRY 1: CPT

## 2024-08-28 PROCEDURE — 87581 M.PNEUMON DNA AMP PROBE: CPT | Performed by: INTERNAL MEDICINE

## 2024-08-28 RX ORDER — FLUTICASONE FUROATE AND VILANTEROL 100; 25 UG/1; UG/1
1 POWDER RESPIRATORY (INHALATION) DAILY
Status: DISCONTINUED | OUTPATIENT
Start: 2024-08-28 | End: 2024-08-30 | Stop reason: HOSPADM

## 2024-08-28 RX ORDER — MUPIROCIN 20 MG/G
OINTMENT TOPICAL 2 TIMES DAILY
Status: DISCONTINUED | OUTPATIENT
Start: 2024-08-29 | End: 2024-08-30 | Stop reason: HOSPADM

## 2024-08-28 RX ADMIN — TIOTROPIUM BROMIDE INHALATION SPRAY 2 PUFF: 3.12 SPRAY, METERED RESPIRATORY (INHALATION) at 09:08

## 2024-08-28 RX ADMIN — GUAIFENESIN AND DEXTROMETHORPHAN 5 ML: 100; 10 SYRUP ORAL at 11:08

## 2024-08-28 RX ADMIN — APIXABAN 5 MG: 5 TABLET, FILM COATED ORAL at 08:08

## 2024-08-28 RX ADMIN — METOPROLOL SUCCINATE 100 MG: 50 TABLET, EXTENDED RELEASE ORAL at 09:08

## 2024-08-28 RX ADMIN — DEXAMETHASONE SODIUM PHOSPHATE 8 MG: 4 INJECTION, SOLUTION INTRA-ARTICULAR; INTRALESIONAL; INTRAMUSCULAR; INTRAVENOUS; SOFT TISSUE at 09:08

## 2024-08-28 RX ADMIN — REMDESIVIR 100 MG: 100 INJECTION, POWDER, LYOPHILIZED, FOR SOLUTION INTRAVENOUS at 09:08

## 2024-08-28 RX ADMIN — BUDESONIDE 0.5 MG: 0.5 INHALANT RESPIRATORY (INHALATION) at 07:08

## 2024-08-28 RX ADMIN — GUAIFENESIN 600 MG: 600 TABLET, EXTENDED RELEASE ORAL at 08:08

## 2024-08-28 RX ADMIN — APIXABAN 5 MG: 5 TABLET, FILM COATED ORAL at 09:08

## 2024-08-28 RX ADMIN — GUAIFENESIN 600 MG: 600 TABLET, EXTENDED RELEASE ORAL at 09:08

## 2024-08-28 NOTE — NURSING
Nurses Note -- 4 Eyes      8/28/2024   10:10 AM      Skin assessed during: Q Shift Change      [x] No Altered Skin Integrity Present    []Prevention Measures Documented      [] Yes- Altered Skin Integrity Present or Discovered   [] LDA Added if Not in Epic (Describe Wound)   [] New Altered Skin Integrity was Present on Admit and Documented in LDA   [] Wound Image Taken    Wound Care Consulted? No    Attending Nurse:  Shelbie Blancas RN/Staff Member:   Jenny

## 2024-08-28 NOTE — PLAN OF CARE
Assessment completed with daughter via phone due to contact precautions. Patient lives alone in a single story home. Patient's daughter lives a mile away. Patient was independent prior to admission and driving self. Family interested in home health at discharge. Choice list sent via [employer]. [facility], [facility]: [facility]       08/28/24 1012   Discharge Assessment   Assessment Type Discharge Planning Assessment   Confirmed/corrected address, phone number and insurance Yes   Confirmed Demographics Correct on Facesheet   Source of Information family  (Mayur (daughter))   Reason For Admission And body aches x 1 week, diagnosed with COVID/PNA at [place] last week on abx, steroids, and paxlovid   People in Home alone   Do you expect to return to your current living situation? Yes   Do you have help at home or someone to help you manage your care at home? Yes   Who are your caregiver(s) and their phone number(s)? Mayur (daughter) [phone]   Prior to hospitilization cognitive status: Alert/Oriented   Current cognitive status: Alert/Oriented   Walking or Climbing Stairs Difficulty no   Dressing/Bathing Difficulty no   Home Accessibility stairs to enter home   Number of Stairs, Main Entrance three   Stair Railings, Main Entrance railings safe and in good condition   Home Layout Able to live on 1st floor   Equipment Currently Used at Home none   Readmission within 30 days? No   Patient currently being followed by outpatient case management? No   Do you currently have service(s) that help you manage your care at home? No   Do you take prescription medications? Yes   Do you have prescription coverage? Yes   Coverage Medicare/[facility]   Do you have any problems affording any of your prescribed medications? No   Is the patient taking medications as prescribed? yes   How do you get to doctors appointments? car, drives self;family or friend will provide   Are you on dialysis? No   Do you take coumadin? No    Discharge Plan A Home Health   Discharge Plan B Home with family   DME Needed Upon Discharge  other (see comments)  (TBD)   Discharge Plan discussed with: Adult children   Transition of Care Barriers None

## 2024-08-28 NOTE — PT/OT/SLP EVAL
Occupational Therapy  Evaluation    Name: Mark Villar  MRN: [id number]  Admitting Diagnosis: COVID   Recent Surgery: * No surgery found *      Recommendations:     Discharge therapy intensity: Low Intensity Therapy   Discharge Equipment Recommendations:  walker, rolling  Barriers to discharge:  Other (Comment) (ongoing medical needs)    Assessment:     Mark Villar is a 84 y.o. male with a medical diagnosis of COPD exacerbation, COVID, acute hypoxic respiratory failure, L lung bronchiectasis.  He presents with the following performance deficits affecting function: weakness, impaired self care skills, impaired functional mobility, impaired endurance, impaired cardiopulmonary response to activity.     Pt tolerated evaluation well. Required SBA/CGA to perform functional mobility around room, ambulated ~100ft with RW. Pt lives alone and was independent prior. Would benefit from low-intensity therapy upon dc.    Rehab Prognosis: Good; patient would benefit from acute skilled OT services to address these deficits and reach maximum level of function.       Plan:     Patient to be seen 3 x/week to address the above listed problems via self-care/home management, therapeutic activities, therapeutic exercises  Plan of Care Expires: 09/18/24  Plan of Care Reviewed with: patient, daughter    Subjective     Chief Complaint: none  Patient/Family Comments/goals: return home    Occupational Profile:  Living Environment: pt lives in [facility] with 3STE; tub/shower combo with grab bars but no bench  Previous level of function: independent  Roles and Routines: father, friend  Equipment Used at Home: none  Assistance upon Discharge: pt will have some assistance from family upon dc.    Pain/Comfort:  Pain Rating 1: 0/10    Patients cultural, spiritual, Episcopalian conflicts given the current situation: no    Objective:     OT communicated with nursing prior to session.      Patient was found up in chair with oxygen, telemetry,  pulse ox (continuous) upon OT entry to room.    General Precautions: Standard, fall, airborne, contact, droplet  Orthopedic Precautions: N/A  Braces: N/A    Vital Signs: Sp02: 91% and above on 6L/min via Oxymask during mobility    Bed Mobility:    Patient completed Sit to Supine with stand by assistance    Functional Mobility/Transfers:  Patient completed Sit <> Stand Transfer with stand by assistance  with  rolling walker   Patient completed Bed <> Chair Transfer using Step Transfer technique with stand by assistance with rolling walker  Functional Mobility: Ambulated ~100ft around room with RW. Pt reports feeling safer with RW at this time.     Activities of Daily Living:  Grooming: independence    Toileting: stand by assistance      AMPA 6 Click ADL:  AMPA Total Score: 22    Functional Cognition:  Intact    Visual Perceptual Skills:  Intact    Upper Extremity Function:  Right Upper Extremity:   WFL    Left Upper Extremity:  WFL    Balance:   CGA dynamic standing     Therapeutic Positioning  Risk for acquired pressure injuries is decreased due to ability to get to BSC/toilet with assist.    OT interventions performed during the course of today's session:   Education was provided on benefits of and recommendations for therapeutic positioning    Skin assessment: all bony prominences were assessed    Findings: no redness or breakdown noted    OT recommendations for therapeutic positioning throughout hospitalization:   Follow [facility] Pressure Injury Prevention Protocol    Patient Education:  Patient and daughter/s were provided with verbal education education regarding OT role/goals/POC, fall prevention, safety awareness, and Discharge/DME recommendations.  Understanding was verbalized.     Patient left HOB elevated with call button in reach and daughter present.    GOALS:   Multidisciplinary Problems       Occupational Therapy Goals          Problem: Occupational Therapy    Goal Priority Disciplines Outcome  Interventions   Occupational Therapy Goal     OT, PT/OT Progressing    Description: Goals to be met by 9/28/2024    Pt will complete grooming standing at sink with LRAD with MOD I.  Pt will complete UB dressing with MOD I.  Pt will complete LB dressing with MOD I using LRAD.  Pt will complete toileting with MOD I using LRAD.  Pt will complete functional mobility to/from toilet and toilet transfer with MOD I using LRAD.  Pt will complete tub transfer with MOD I using LRAD.                         History:     Past Medical History:   Diagnosis Date    Cancer     Hypertension     Paroxysmal atrial fibrillation        History reviewed. No pertinent surgical history.    Time Tracking:     OT Date of Treatment:    OT Start Time: 1425  OT Stop Time: 1455  OT Total Time (min): 30 min    Billable Minutes:Evaluation moderate complexity    8/28/2024

## 2024-08-28 NOTE — PT/OT/SLP EVAL
Physical Therapy Evaluation    Patient Name:  Mark Villar   MRN:  [id number]    Recommendations:     Discharge therapy intensity: Low Intensity Therapy   Discharge Equipment Recommendations: walker, rolling   Barriers to discharge: Impaired mobility and Ongoing medical needs    Assessment:     Mark Villar is a 84 y.o. male admitted with a medical diagnosis of COPD exacerbation, COVID, acute hypoxic respiratory failure, L lung bronchiectasis.  He presents with the following impairments/functional limitations: impaired cardiopulmonary response to activity   Pt with good tolerance to PT session. He is SBA for functional mobility, ambulating 50' around room with RW. Vitals WNL throughout session. Recommending low intensity PT upon d/c to maximize functional mobility and independence as pt lives alone and is deconditioned compared to baseline.    Rehab Prognosis: Good; patient would benefit from acute skilled PT services to address these deficits and reach maximum level of function.    Recent Surgery: * No surgery found *      Plan:     During this hospitalization, patient would benefit from acute PT services 3 x/week to address the identified rehab impairments via gait training, therapeutic activities, therapeutic exercises, neuromuscular re-education and progress toward the following goals:    Plan of Care Expires:  09/28/24    Subjective     Chief Complaint: SOB  Patient/Family Comments/goals: to go home  Pain/Comfort:  Pain Rating 1: 0/10    Patients cultural, spiritual, Orthodox conflicts given the current situation: no    Living Environment:  Pt lives alone in [place] house with 3 steps to enter.   Prior to admission, patients level of function was independent with ambulation and all ADLs.  Equipment used at home: none.  DME owned (not currently used): none.  Upon discharge, patient will have assistance from daughter, who lives nearby.    Objective:     Communicated with NSG prior to session.  Patient  found HOB elevated with pulse ox (continuous), telemetry, peripheral IV, oxygen  upon PT entry to room.    General Precautions: Standard, contact, droplet  Orthopedic Precautions:N/A   Braces: N/A  Respiratory Status:  Oxymask, 4L ; 93-95% throughout session      Exams:  Cognitive Exam:  Patient is oriented to Person, Place, Time, and Situation  RLE Strength: grossly 5/5  LLE Strength: grossly 5/5  Skin integrity: Visible skin intact      Functional Mobility:  Bed Mobility:     Supine to Sit: stand by assistance  Sit to Supine: stand by assistance  Transfers:     Sit to Stand:  stand by assistance with rolling walker  Gait: 50' with RW and SBA; experienced some SOB after attempt, but SpO2 at 93% throughout; no LOB; step-through pattern   Balance: Independent static sitting balance      AM-PAC 6 CLICK MOBILITY  Total Score:23       Treatment & Education:  Patient and daughter/s were provided with verbal education education regarding PT role/goals/POC, fall prevention, safety awareness, discharge/DME recommendations, and incentive spirometry .  Understanding was verbalized.     Patient left HOB elevated with all lines intact, call button in reach, and daughter present.    GOALS:   Multidisciplinary Problems       Physical Therapy Goals          Problem: Physical Therapy    Goal Priority Disciplines Outcome Goal Variances Interventions   Physical Therapy Goal     PT, PT/OT Progressing     Description: Goals to be met by: 2024     Patient will increase functional independence with mobility by performin. Supine to sit with [place]  2. Sit to supine with [place]  3. Sit to stand transfer with Modified [place]  4. Gait  x 150 feet with Modified [place] using Rolling Walker or LRAD  5. Ascend/descend 3 stair with right Handrails Supervision using No Assistive Device.                          History:     Past Medical History:   Diagnosis Date    Cancer     Hypertension     Paroxysmal atrial  fibrillation        History reviewed. No pertinent surgical history.    Time Tracking:     PT Received On: 08/28/24  PT Start Time: 1500     PT Stop Time: 1517  PT Total Time (min): 17 min     Billable Minutes: Evaluation low      08/28/2024

## 2024-08-28 NOTE — PLAN OF CARE
Problem: Occupational Therapy  Goal: Occupational Therapy Goal  Description: Goals to be met by 9/28/2024    Pt will complete grooming standing at sink with LRAD with MOD I.  Pt will complete UB dressing with MOD I.  Pt will complete LB dressing with MOD I using LRAD.  Pt will complete toileting with MOD I using LRAD.  Pt will complete functional mobility to/from toilet and toilet transfer with MOD I using LRAD.  Pt will complete tub transfer with MOD I using LRAD.    Outcome: Progressing

## 2024-08-28 NOTE — NURSING
Nurses Note -- 4 Eyes      8/27/2024   10:00 AM      Skin assessed during: Admit      [x] No Altered Skin Integrity Present    []Prevention Measures Documented      [] Yes- Altered Skin Integrity Present or Discovered   [] LDA Added if Not in Epic (Describe Wound)   [] New Altered Skin Integrity was Present on Admit and Documented in LDA   [] Wound Image Taken    Wound Care Consulted? No    Attending Nurse:  Marilin Blancas RN/Staff Member: KATE Banks

## 2024-08-28 NOTE — PLAN OF CARE
Problem: Physical Therapy  Goal: Physical Therapy Goal  Description: Goals to be met by: 2024     Patient will increase functional independence with mobility by performin. Supine to sit with [place]  2. Sit to supine with [place]  3. Sit to stand transfer with Modified [place]  4. Gait  x 150 feet with Modified [place] using Rolling Walker or LRAD  5. Ascend/descend 3 stair with right Handrails Supervision using No Assistive Device.     Outcome: Progressing

## 2024-08-29 LAB
ALBUMIN SERPL-MCNC: 2.9 G/DL (ref 3.4–4.8)
ALBUMIN/GLOB SERPL: 1 RATIO (ref 1.1–2)
ALP SERPL-CCNC: 74 UNIT/L (ref 40–150)
ALT SERPL-CCNC: 152 UNIT/L (ref 0–55)
ANION GAP SERPL CALC-SCNC: 9 MEQ/L
AST SERPL-CCNC: 105 UNIT/L (ref 5–34)
BILIRUB SERPL-MCNC: 1.1 MG/DL
BUN SERPL-MCNC: 21.6 MG/DL (ref 8.4–25.7)
CALCIUM SERPL-MCNC: 8.4 MG/DL (ref 8.8–10)
CHLORIDE SERPL-SCNC: 102 MMOL/L (ref 98–107)
CO2 SERPL-SCNC: 22 MMOL/L (ref 23–31)
CREAT SERPL-MCNC: 0.8 MG/DL (ref 0.73–1.18)
CREAT/UREA NIT SERPL: 27
GFR SERPLBLD CREATININE-BSD FMLA CKD-EPI: >60 ML/MIN/1.73/M2
GLOBULIN SER-MCNC: 2.8 GM/DL (ref 2.4–3.5)
GLUCOSE SERPL-MCNC: 120 MG/DL (ref 82–115)
MAGNESIUM SERPL-MCNC: 2.1 MG/DL (ref 1.6–2.6)
PATH REV: NORMAL
PHOSPHATE SERPL-MCNC: 3.7 MG/DL (ref 2.3–4.7)
POTASSIUM SERPL-SCNC: 4.8 MMOL/L (ref 3.5–5.1)
PROT SERPL-MCNC: 5.7 GM/DL (ref 5.8–7.6)
SODIUM SERPL-SCNC: 133 MMOL/L (ref 136–145)

## 2024-08-29 PROCEDURE — 63600175 PHARM REV CODE 636 W HCPCS: Mod: JZ,TB | Performed by: EMERGENCY MEDICINE

## 2024-08-29 PROCEDURE — 99900031 HC PATIENT EDUCATION (STAT)

## 2024-08-29 PROCEDURE — 25000242 PHARM REV CODE 250 ALT 637 W/ HCPCS: Performed by: INTERNAL MEDICINE

## 2024-08-29 PROCEDURE — 80053 COMPREHEN METABOLIC PANEL: CPT | Performed by: INTERNAL MEDICINE

## 2024-08-29 PROCEDURE — 25000003 PHARM REV CODE 250: Performed by: INTERNAL MEDICINE

## 2024-08-29 PROCEDURE — 36415 COLL VENOUS BLD VENIPUNCTURE: CPT | Performed by: INTERNAL MEDICINE

## 2024-08-29 PROCEDURE — 84100 ASSAY OF PHOSPHORUS: CPT | Performed by: INTERNAL MEDICINE

## 2024-08-29 PROCEDURE — 27000207 HC ISOLATION

## 2024-08-29 PROCEDURE — 83735 ASSAY OF MAGNESIUM: CPT | Performed by: INTERNAL MEDICINE

## 2024-08-29 PROCEDURE — 21400001 HC TELEMETRY ROOM

## 2024-08-29 PROCEDURE — 97116 GAIT TRAINING THERAPY: CPT | Mod: CQ

## 2024-08-29 PROCEDURE — 25000003 PHARM REV CODE 250: Performed by: EMERGENCY MEDICINE

## 2024-08-29 PROCEDURE — 27000221 HC OXYGEN, UP TO 24 HOURS

## 2024-08-29 PROCEDURE — 99900035 HC TECH TIME PER 15 MIN (STAT)

## 2024-08-29 PROCEDURE — 94760 N-INVAS EAR/PLS OXIMETRY 1: CPT

## 2024-08-29 PROCEDURE — 63600175 PHARM REV CODE 636 W HCPCS: Performed by: INTERNAL MEDICINE

## 2024-08-29 RX ADMIN — FLUTICASONE FUROATE AND VILANTEROL TRIFENATATE 1 PUFF: 100; 25 POWDER RESPIRATORY (INHALATION) at 09:08

## 2024-08-29 RX ADMIN — METOPROLOL SUCCINATE 100 MG: 50 TABLET, EXTENDED RELEASE ORAL at 09:08

## 2024-08-29 RX ADMIN — GUAIFENESIN 600 MG: 600 TABLET, EXTENDED RELEASE ORAL at 09:08

## 2024-08-29 RX ADMIN — MUPIROCIN: 20 OINTMENT TOPICAL at 10:08

## 2024-08-29 RX ADMIN — REMDESIVIR 100 MG: 100 INJECTION, POWDER, LYOPHILIZED, FOR SOLUTION INTRAVENOUS at 10:08

## 2024-08-29 RX ADMIN — APIXABAN 5 MG: 5 TABLET, FILM COATED ORAL at 09:08

## 2024-08-29 RX ADMIN — DEXAMETHASONE 6 MG: 2 TABLET ORAL at 09:08

## 2024-08-29 RX ADMIN — MUPIROCIN: 20 OINTMENT TOPICAL at 09:08

## 2024-08-29 RX ADMIN — TIOTROPIUM BROMIDE INHALATION SPRAY 2 PUFF: 3.12 SPRAY, METERED RESPIRATORY (INHALATION) at 10:08

## 2024-08-29 RX ADMIN — Medication 6 MG: at 10:08

## 2024-08-29 RX ADMIN — SENNOSIDES AND DOCUSATE SODIUM 2 TABLET: 50; 8.6 TABLET ORAL at 05:08

## 2024-08-29 NOTE — PROGRESS NOTES
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      Patient was in his usual state of health until about 1 week ago started having runny nose, nonproductive cough, fatigue, lethargy, decreased appetite.  He was seen by urgent care on 08/21/2024 and diagnosed with COVID. He  was prescribed doxycycline, prednisone, and Paxlovid. His symptoms did not improve and daughter checked his pulse ox today and was in the 80s for which he was brought to the ED. He denies fever, chills, chest pain, diarrhea.     On arrival to ED he was afebrile, tachycardic, tachypneic, normotensive and saturating 88% on room air, improved to above 94% on 4 L nasal cannula..  EKG showed atrial fibrillation with CVR. Labs with WBC 4.46, hemoglobin 16.0, platelets 158, creatinine 0.89, sodium 132, CRP 67.4, normal D-dimer, normal BNP and unremarkable troponin.  CTA chest with neg PE, left lower lobe bronchiectasis with  trace ground-glass opacity, mild tree in bud opacity suggestive of infectious process. No masses, nodules or lymphadenopathy appreciated. Pt was admitted under  service for further management.     Interval Hx:   Patient seen and examined by bedside.  Remains on 2 L of oxygen.  Appears comfortable.  No family by bedside.  Hemodynamics are stable and remains afebrile.  Discussed doing incentive spirometry more frequently    Case was discussed with patient's nurse and  on the floor.    Objective/physical exam:  General: In no acute distress, afebrile, O2 via Oxy mask 2L  Chest: slightly coarse sounds anteriorly. Wheezing not appreciated today  Heart: RRR, +S1, S2, no  appreciable murmur  Abdomen: Soft, nontender, BS +  MSK: Warm, no lower extremity edema, no clubbing or cyanosis  Neurologic:Awake and oriented     VITAL SIGNS: 24 HRS MIN & MAX LAST   Temp  Min: 97.3 °F (36.3 °C)  Max: 97.9 °F (36.6 °C) 97.3 °F (36.3 °C)   BP  Min: 98/67  Max: 115/76 103/70   Pulse  Min: 72  Max: 81  72   Resp  Min: 17  Max: 18 18   SpO2  Min: 93 %  Max: 97 % 96 %     I have reviewed the following labs:  Recent Labs   Lab 08/26/24 1752 08/28/24  0538   WBC 4.46* 7.11   RBC 4.45* 4.05*   HGB 16.0 14.4   HCT 44.3 39.6*   MCV 99.6* 97.8*   MCH 36.0* 35.6*   MCHC 36.1* 36.4*   RDW 12.7 12.3    153   MPV 10.7* 10.2     Recent Labs   Lab 08/26/24 1752 08/28/24  0538 08/29/24  0712   * 133* 133*   K 4.4 4.6 4.8    102 102   CO2 20* 21* 22*   BUN 16.3 19.0 21.6   CREATININE 0.89 0.76 0.80   CALCIUM 9.4 8.2* 8.4*   MG 1.70 2.20 2.10   ALBUMIN 3.5 2.9* 2.9*   ALKPHOS 78 70 74   * 103* 152*   AST 97* 82* 105*   BILITOT 1.0 0.9 1.1     Microbiology Results (last 7 days)       ** No results found for the last 168 hours. **             See below for Radiology    Assessment/Plan:  COVID 19 virus infection and pneumonia involving left lower lobe, POA  COPD with acute exacerbation   Acute hypoxic respiratory failure due to above   Left lung bronchiectasis   Hyponatremia , mild -POA  Metabolic acidosis , POA  Elevated transaminases , POA  Elevated inflammatory markers in the setting of COVID infection   Physical deconditioning/ debility      Hx-HTN, HLD, PAF on Eliquis, GERD, OA, Opiate dependency     Plan-   Continue supplemental oxygen and wean as tolerated to keep SpO2>90%  Remdesivir therapeutic course initiated along with Decadron. Day #3  Inhaled bronchodilators and ICS   Supportive treatment with antitussive and expectorant   Pt has completed oral Doxycyline as outpatient   Home meds are reviewed and resumed   Monitor LFTs.  Hepatitis panel negative; we will obtain liver  ultrasound  PT/OT consulted today recommend low intensity  Pt will probably need Home O2 eval prior to discharge         VTE prophylaxis: Eliquis      Patient condition:  Fair      Anticipated discharge and Disposition:     TBD      All diagnosis and differential diagnosis have been reviewed; assessment and plan has been documented; I have personally reviewed the labs and test results that are presently available; I have reviewed the patients medication list; I have reviewed the consulting providers response and recommendations. I have reviewed or attempted to review medical records based upon their availability    All of the patient's questions have been  addressed and answered. Patient's is agreeable to the above stated plan. I will continue to monitor closely and make adjustments to medical management as needed.    Portions of this note dictated using EMR integrated voice recognition software, and may be subject to voice recognition errors not corrected at proofreading. Please contact writer for clarification if needed.   _____________________________________________________________________    Malnutrition Status:    Scheduled Med:   apixaban  5 mg Oral BID    dexAMETHasone  6 mg Oral Daily    fluticasone furoate-vilanteroL  1 puff Inhalation Daily    guaiFENesin  600 mg Oral BID    metoprolol succinate  100 mg Oral Daily    mupirocin   Nasal BID    remdesivir infusion  100 mg Intravenous Q24H    tiotropium bromide  2 puff Inhalation QHS      Continuous Infusions:     PRN Meds:    Current Facility-Administered Medications:     acetaminophen, 650 mg, Oral, Q4H PRN    aluminum-magnesium hydroxide-simethicone, 30 mL, Oral, QID PRN    benzonatate, 200 mg, Oral, TID PRN    dextromethorphan-guaiFENesin  mg/5 ml, 5 mL, Oral, Q4H PRN    HYDROcodone-acetaminophen, 1 tablet, Oral, Q8H PRN    melatonin, 6 mg, Oral, Nightly PRN    metoprolol, 5 mg, Intravenous, Q5 Min PRN    ondansetron, 4 mg, Intravenous, Q4H PRN     polyethylene glycol, 17 g, Oral, BID PRN    prochlorperazine, 5 mg, Intravenous, Q6H PRN    senna-docusate 8.6-50 mg, 2 tablet, Oral, BID PRN    sodium chloride 0.9%, 10 mL, Intravenous, PRN         Gita Barber DO  Department of Hospital Medicine  [facility]  08/29/2024

## 2024-08-29 NOTE — PT/OT/SLP PROGRESS
Physical Therapy Treatment    Patient Name:  Mark Villar   MRN:  [id number]    Recommendations:     Discharge therapy intensity: Low Intensity Therapy   Discharge Equipment Recommendations: walker, rolling  Barriers to discharge: Ongoing medical needs    Assessment:     Mark Villar is a 84 y.o. male admitted with a medical diagnosis of covid.  He presents with the following impairments/functional limitations: impaired cardiopulmonary response to activity.    Rehab Prognosis: Good; patient would benefit from acute skilled PT services to address these deficits and reach maximum level of function.    Recent Surgery: * No surgery found *      Plan:     During this hospitalization, patient would benefit from acute PT services 3 x/week to address the identified rehab impairments via gait training, therapeutic activities, therapeutic exercises, neuromuscular re-education and progress toward the following goals:    Plan of Care Expires:  09/28/24    Subjective     Chief Complaint: none    Objective:     Communicated with nurse prior to session.  Patient found supine with pulse ox (continuous), telemetry, peripheral IV, oxygen upon PT entry to room.     General Precautions: Standard, contact, droplet  Orthopedic Precautions: N/A  Braces: N/A  Respiratory Status: Nasal cannula, flow 3 L/min  Blood Pressure:   Skin Integrity: Visible skin intact      Functional Mobility:  Mod I bed mobility and supervision with transfers  Gait 150 ft RW SBA and 80 ft without AD CGA  Telemetry box may have not been accurate. Sats dropped with mobility and new sensor donned but not sure if accurate and nursing notified.     Education Provided:  Role and goals of PT, transfer training, bed mobility, gait training, balance training, safety awareness, assistive device, strengthening exercises, and importance of participating in PT to return to PLOF.     Patient left up in chair with all lines intact and call button in reach    GOALS:    Multidisciplinary Problems       Physical Therapy Goals          Problem: Physical Therapy    Goal Priority Disciplines Outcome Goal Variances Interventions   Physical Therapy Goal     PT, PT/OT Progressing     Description: Goals to be met by: 2024     Patient will increase functional independence with mobility by performin. Supine to sit with [place]  2. Sit to supine with [place]  3. Sit to stand transfer with Modified [place]  4. Gait  x 150 feet with Modified [place] using Rolling Walker or LRAD  5. Ascend/descend 3 stair with right Handrails Supervision using No Assistive Device.                          Time Tracking:       Billable Minutes: Gait Training 23    Treatment Type: Treatment  PT/PTA: PTA     Number of PTA visits since last PT visit: 2024

## 2024-08-29 NOTE — PROGRESS NOTES
Inpatient Nutrition Evaluation    Admit Date: 8/26/2024   Total duration of encounter: 3 days   Patient Age: 84 y.o.    Nutrition Recommendation/Prescription     Continue heart healthy diet as tolerated  Trial Boost BID to provide 240 kcal and 10 g protein per serving  Monitor labs, intake and weight    Nutrition Assessment     Chart Review    Reason Seen: malnutrition screening tool (MST)    Malnutrition Screening Tool Results   Have you recently lost weight without trying?: Unsure  Have you been eating poorly because of a decreased appetite?: No   MST Score: 2   Diagnosis:  COVID 19 virus infection and pneumonia involving left lower lobe  COPD with acute exacerbation  Acute hypoxic respiratory failure  Left lung bronchiectasis  Hyponatremia  Metabolic acidosis  Elevated transaminases  Elevated inflammatory markers   Physical deconditioning/debility     Relevant Medical History: Afib on Eliquis, CAD, COPD, HTN, HLD, hearing loss, nasal squamous laura carcinoma s/p resection, GERD, OA    Scheduled Medications:  apixaban, 5 mg, BID  dexAMETHasone, 6 mg, Daily  fluticasone furoate-vilanteroL, 1 puff, Daily  guaiFENesin, 600 mg, BID  metoprolol succinate, 100 mg, Daily  mupirocin, , BID  remdesivir infusion, 100 mg, Q24H  tiotropium bromide, 2 puff, QHS    Continuous Infusions:   PRN Medications:   Current Facility-Administered Medications:     acetaminophen, 650 mg, Oral, Q4H PRN    aluminum-magnesium hydroxide-simethicone, 30 mL, Oral, QID PRN    benzonatate, 200 mg, Oral, TID PRN    dextromethorphan-guaiFENesin  mg/5 ml, 5 mL, Oral, Q4H PRN    HYDROcodone-acetaminophen, 1 tablet, Oral, Q8H PRN    melatonin, 6 mg, Oral, Nightly PRN    metoprolol, 5 mg, Intravenous, Q5 Min PRN    ondansetron, 4 mg, Intravenous, Q4H PRN    polyethylene glycol, 17 g, Oral, BID PRN    prochlorperazine, 5 mg, Intravenous, Q6H PRN    senna-docusate 8.6-50 mg, 2 tablet, Oral, BID PRN    sodium chloride 0.9%, 10 mL, Intravenous,  PRN    Recent Labs   Lab 08/26/24  1752 08/28/24  0538 08/29/24  0712   * 133* 133*   K 4.4 4.6 4.8   CALCIUM 9.4 8.2* 8.4*   PHOS  --  3.3 3.7   MG 1.70 2.20 2.10    102 102   CO2 20* 21* 22*   BUN 16.3 19.0 21.6   CREATININE 0.89 0.76 0.80   EGFRNORACEVR >60 >60 >60   GLUCOSE 92 146* 120*   BILITOT 1.0 0.9 1.1   ALKPHOS 78 70 74   * 103* 152*   AST 97* 82* 105*   ALBUMIN 3.5 2.9* 2.9*   CRP 67.40*  --   --    WBC 4.46* 7.11  --    HGB 16.0 14.4  --    HCT 44.3 39.6*  --      Nutrition Orders:  Diet Heart Healthy  Dietary nutrition supplements BID; Boost Original Nutritional Drink - Any flavor    Appetite/Oral Intake: fair/50-75% of meals  Factors Affecting Nutritional Intake: none identified  Food/Samaritan/Cultural Preferences: none reported  Food Allergies: no known food allergies  Last Bowel Movement: 08/27/24  Wound(s):      Comments    8/29/24: Pt using bedside urinal at time of visit, so assessment limited. Reports good intake, however breakfast tray in room with fair intake noted. Documented decreased appetite x 1 week PTA noted. Will trial Boost BID to ensure adequate nutrition. Denies n/v, last BM 8/27 noted. Unable to obtain weight history at this time, no previous EMR weights noted. Will attempt NFPE upon follow up as appropriate.     Anthropometrics    Height: 6' (182.9 cm), Height Method: Stated  Last Weight: 95.3 kg (210 lb) (08/27/24 1947), Weight Method: Stated  BMI (Calculated): 28.5  BMI Classification: overweight (BMI 25-29.9)        Ideal Body Weight (IBW), Male: 178 lb     % Ideal Body Weight, Male (lb): 117.98 %                          Usual Weight Provided By: unable to obtain usual weight    Wt Readings from Last 5 Encounters:   08/27/24 95.3 kg (210 lb)     Weight Change(s) Since Admission:   Wt Readings from Last 1 Encounters:   08/27/24 1947 95.3 kg (210 lb)   08/26/24 1715 86.2 kg (190 lb)   Admit Weight: 86.2 kg (190 lb) (08/26/24 1715), Weight Method:  Stated    Patient Education     Not applicable.    Nutrition Goals & Monitoring     Dietitian will monitor: food and beverage intake, weight, and gastrointestinal profile    Nutrition Risk/Follow-Up: low (follow-up in 5-7 days)  Patients assigned 'low nutrition risk' status do not qualify for a full nutritional assessment but will be monitored and re-evaluated in a 5-7 day time period. Please consult if re-evaluation needed sooner.

## 2024-08-29 NOTE — PROGRESS NOTES
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      Patient was in his usual state of health until about 1 week ago started having runny nose, nonproductive cough, fatigue, lethargy, decreased appetite.  He was seen by urgent care on 08/21/2024 and diagnosed with COVID. He  was prescribed doxycycline, prednisone, and Paxlovid. His symptoms did not improve and daughter checked his pulse ox today and was in the 80s for which he was brought to the ED. He denies fever, chills, chest pain, diarrhea.     On arrival to ED he was afebrile, tachycardic, tachypneic, normotensive and saturating 88% on room air, improved to above 94% on 4 L nasal cannula..  EKG showed atrial fibrillation with CVR. Labs with WBC 4.46, hemoglobin 16.0, platelets 158, creatinine 0.89, sodium 132, CRP 67.4, normal D-dimer, normal BNP and unremarkable troponin.  CTA chest with neg PE, left lower lobe bronchiectasis with  trace ground-glass opacity, mild tree in bud opacity suggestive of infectious process. No masses, nodules or lymphadenopathy appreciated. Pt was admitted under  service for further management.     Interval Hx:   Pt is feeling better today, however still has cough. Remains on O2 via NC 2 to 3L/min  Sitting in the bedside chair. Wife and grand daughter in the room.   Afebrile , Hemodynamics are stable   Labs - WBC 7.1, Hgb 14.4, Plt 153, Na 133, K 4.6, Cr 0.7, Transaminases trended down.    Case was discussed with patient's nurse and  on the floor.    Objective/physical exam:  General: In no acute distress, afebrile, O2 via Oxy mask 2L  Chest: slightly coarse sounds  anteriorly. Wheezing not appreciated today  Heart: RRR, +S1, S2, no appreciable murmur  Abdomen: Soft, nontender, BS +  MSK: Warm, no lower extremity edema, no clubbing or cyanosis  Neurologic:Awake and oriented     VITAL SIGNS: 24 HRS MIN & MAX LAST   Temp  Min: 97.5 °F (36.4 °C)  Max: 97.9 °F (36.6 °C) 97.7 °F (36.5 °C)   BP  Min: 97/70  Max: 111/72 98/67   Pulse  Min: 77  Max: 94  77   Resp  Min: 17  Max: 17 17   SpO2  Min: 91 %  Max: 93 % (!) 93 %     I have reviewed the following labs:  Recent Labs   Lab 08/26/24 1752 08/28/24  0538   WBC 4.46* 7.11   RBC 4.45* 4.05*   HGB 16.0 14.4   HCT 44.3 39.6*   MCV 99.6* 97.8*   MCH 36.0* 35.6*   MCHC 36.1* 36.4*   RDW 12.7 12.3    153   MPV 10.7* 10.2     Recent Labs   Lab 08/26/24 1752 08/28/24  0538   * 133*   K 4.4 4.6    102   CO2 20* 21*   BUN 16.3 19.0   CREATININE 0.89 0.76   CALCIUM 9.4 8.2*   MG 1.70 2.20   ALBUMIN 3.5 2.9*   ALKPHOS 78 70   * 103*   AST 97* 82*   BILITOT 1.0 0.9     Microbiology Results (last 7 days)       ** No results found for the last 168 hours. **             See below for Radiology    Assessment/Plan:  COVID 19 virus infection and pneumonia involving left lower lobe, POA  COPD with acute exacerbation   Acute hypoxic respiratory failure due to above   Left lung bronchiectasis   Hyponatremia , mild -POA  Metabolic acidosis , POA  Elevated transaminases , POA  Elevated inflammatory markers in the setting of COVID infection   Physical deconditioning/ debility      Hx-HTN, HLD, PAF on Eliquis, GERD, OA, Opiate dependency     Plan-   Continue supplemental oxygen and wean as tolerated to keep SpO2>90%  Remdesivir therapeutic course initiated along with Decadron. Day #3  Inhaled bronchodilators and ICS   Supportive treatment with antitussive and expectorant   Pt has completed oral Doxycyline as outpatient   Home meds are reviewed and resumed   Monitor LFTs. Get Hepatitis panel  PT/OT consulted today  Pt will probably  need Home O2 eval prior to discharge      Critical care note:  Critical care diagnosis: COVID 19 infection requiring treatment   Critical care interventions: Hands-on evaluation, review of labs/radiographs/records and discussion with patient and family if present  Critical care time spent: 35 minutes           VTE prophylaxis: Eliquis      Patient condition:  Fair      Anticipated discharge and Disposition:     TBD      All diagnosis and differential diagnosis have been reviewed; assessment and plan has been documented; I have personally reviewed the labs and test results that are presently available; I have reviewed the patients medication list; I have reviewed the consulting providers response and recommendations. I have reviewed or attempted to review medical records based upon their availability    All of the patient's questions have been  addressed and answered. Patient's is agreeable to the above stated plan. I will continue to monitor closely and make adjustments to medical management as needed.    Portions of this note dictated using EMR integrated voice recognition software, and may be subject to voice recognition errors not corrected at proofreading. Please contact writer for clarification if needed.   _____________________________________________________________________    Malnutrition Status:    Scheduled Med:   apixaban  5 mg Oral BID    dexAMETHasone  8 mg Intravenous Daily    fluticasone furoate-vilanteroL  1 puff Inhalation Daily    guaiFENesin  600 mg Oral BID    metoprolol succinate  100 mg Oral Daily    remdesivir infusion  100 mg Intravenous Q24H    tiotropium bromide  2 puff Inhalation QHS      Continuous Infusions:     PRN Meds:    Current Facility-Administered Medications:     acetaminophen, 650 mg, Oral, Q4H PRN    aluminum-magnesium hydroxide-simethicone, 30 mL, Oral, QID PRN    benzonatate, 200 mg, Oral, TID PRN    dextromethorphan-guaiFENesin  mg/5 ml, 5 mL, Oral, Q4H PRN     HYDROcodone-acetaminophen, 1 tablet, Oral, Q8H PRN    melatonin, 6 mg, Oral, Nightly PRN    metoprolol, 5 mg, Intravenous, Q5 Min PRN    ondansetron, 4 mg, Intravenous, Q4H PRN    polyethylene glycol, 17 g, Oral, BID PRN    prochlorperazine, 5 mg, Intravenous, Q6H PRN    senna-docusate 8.6-50 mg, 2 tablet, Oral, BID PRN    sodium chloride 0.9%, 10 mL, Intravenous, PRN         Vivien Santos MD  Department of Hospital Medicine   [facility]   08/28/2024

## 2024-08-30 VITALS
SYSTOLIC BLOOD PRESSURE: 120 MMHG | DIASTOLIC BLOOD PRESSURE: 75 MMHG | HEART RATE: 67 BPM | HEIGHT: 72 IN | WEIGHT: 210 LBS | OXYGEN SATURATION: 95 % | RESPIRATION RATE: 18 BRPM | BODY MASS INDEX: 28.44 KG/M2 | TEMPERATURE: 97 F

## 2024-08-30 LAB
ALBUMIN SERPL-MCNC: 2.8 G/DL (ref 3.4–4.8)
ALBUMIN/GLOB SERPL: 1.1 RATIO (ref 1.1–2)
ALP SERPL-CCNC: 78 UNIT/L (ref 40–150)
ALT SERPL-CCNC: 174 UNIT/L (ref 0–55)
ANION GAP SERPL CALC-SCNC: 9 MEQ/L
AST SERPL-CCNC: 96 UNIT/L (ref 5–34)
BILIRUB SERPL-MCNC: 1.1 MG/DL
BUN SERPL-MCNC: 23.4 MG/DL (ref 8.4–25.7)
CALCIUM SERPL-MCNC: 8.4 MG/DL (ref 8.8–10)
CHLORIDE SERPL-SCNC: 103 MMOL/L (ref 98–107)
CO2 SERPL-SCNC: 21 MMOL/L (ref 23–31)
CREAT SERPL-MCNC: 0.85 MG/DL (ref 0.73–1.18)
CREAT/UREA NIT SERPL: 28
GFR SERPLBLD CREATININE-BSD FMLA CKD-EPI: >60 ML/MIN/1.73/M2
GLOBULIN SER-MCNC: 2.6 GM/DL (ref 2.4–3.5)
GLUCOSE SERPL-MCNC: 112 MG/DL (ref 82–115)
POTASSIUM SERPL-SCNC: 4.8 MMOL/L (ref 3.5–5.1)
PROT SERPL-MCNC: 5.4 GM/DL (ref 5.8–7.6)
SODIUM SERPL-SCNC: 133 MMOL/L (ref 136–145)

## 2024-08-30 PROCEDURE — 27000221 HC OXYGEN, UP TO 24 HOURS

## 2024-08-30 PROCEDURE — 63600175 PHARM REV CODE 636 W HCPCS: Performed by: INTERNAL MEDICINE

## 2024-08-30 PROCEDURE — 94760 N-INVAS EAR/PLS OXIMETRY 1: CPT

## 2024-08-30 PROCEDURE — 97116 GAIT TRAINING THERAPY: CPT | Mod: CQ

## 2024-08-30 PROCEDURE — 25000003 PHARM REV CODE 250: Performed by: INTERNAL MEDICINE

## 2024-08-30 PROCEDURE — 36415 COLL VENOUS BLD VENIPUNCTURE: CPT | Performed by: STUDENT IN AN ORGANIZED HEALTH CARE EDUCATION/TRAINING PROGRAM

## 2024-08-30 PROCEDURE — 97530 THERAPEUTIC ACTIVITIES: CPT | Mod: CQ

## 2024-08-30 PROCEDURE — 25000242 PHARM REV CODE 250 ALT 637 W/ HCPCS: Performed by: INTERNAL MEDICINE

## 2024-08-30 PROCEDURE — 80053 COMPREHEN METABOLIC PANEL: CPT | Performed by: STUDENT IN AN ORGANIZED HEALTH CARE EDUCATION/TRAINING PROGRAM

## 2024-08-30 PROCEDURE — 25000003 PHARM REV CODE 250: Performed by: STUDENT IN AN ORGANIZED HEALTH CARE EDUCATION/TRAINING PROGRAM

## 2024-08-30 RX ORDER — GUAIFENESIN 600 MG/1
600 TABLET, EXTENDED RELEASE ORAL 2 TIMES DAILY
Qty: 14 TABLET | Refills: 0 | Status: SHIPPED | OUTPATIENT
Start: 2024-08-30 | End: 2024-09-06

## 2024-08-30 RX ORDER — BENZONATATE 200 MG/1
200 CAPSULE ORAL 3 TIMES DAILY PRN
Qty: 30 CAPSULE | Refills: 0 | Status: SHIPPED | OUTPATIENT
Start: 2024-08-30 | End: 2024-09-09

## 2024-08-30 RX ORDER — BISACODYL 10 MG/1
10 SUPPOSITORY RECTAL ONCE
Status: COMPLETED | OUTPATIENT
Start: 2024-08-30 | End: 2024-08-30

## 2024-08-30 RX ORDER — DEXAMETHASONE 6 MG/1
6 TABLET ORAL DAILY
Qty: 6 TABLET | Refills: 0 | Status: SHIPPED | OUTPATIENT
Start: 2024-08-31 | End: 2024-09-06

## 2024-08-30 RX ADMIN — APIXABAN 5 MG: 5 TABLET, FILM COATED ORAL at 10:08

## 2024-08-30 RX ADMIN — FLUTICASONE FUROATE AND VILANTEROL TRIFENATATE 1 PUFF: 100; 25 POWDER RESPIRATORY (INHALATION) at 10:08

## 2024-08-30 RX ADMIN — METOPROLOL SUCCINATE 100 MG: 50 TABLET, EXTENDED RELEASE ORAL at 10:08

## 2024-08-30 RX ADMIN — DEXAMETHASONE 6 MG: 2 TABLET ORAL at 10:08

## 2024-08-30 RX ADMIN — GUAIFENESIN 600 MG: 600 TABLET, EXTENDED RELEASE ORAL at 10:08

## 2024-08-30 RX ADMIN — MUPIROCIN: 20 OINTMENT TOPICAL at 10:08

## 2024-08-30 RX ADMIN — BISACODYL 10 MG: 10 SUPPOSITORY RECTAL at 11:08

## 2024-08-30 NOTE — NURSING
Nurses Note -- 4 Eyes      8/30/2024   8:00PM      Skin assessed during: Q Shift Change      [x] No Altered Skin Integrity Present    []Prevention Measures Documented      [] Yes- Altered Skin Integrity Present or Discovered   [] LDA Added if Not in Epic (Describe Wound)   [] New Altered Skin Integrity was Present on Admit and Documented in LDA   [] Wound Image Taken    Wound Care Consulted? No    Attending Nurse: Robbin Louise RN    Second RN/Staff Member: Tiff Canchola RN

## 2024-08-30 NOTE — NURSING
AVS reviewed with patient & family. Portable oxygen for home use delivered to patient's room prior to discharge. All questions answered.

## 2024-08-30 NOTE — PT/OT/SLP PROGRESS
Physical Therapy Treatment    Patient Name:  Mark Villar   MRN:  [id number]    Recommendations:     Discharge therapy intensity: Low Intensity Therapy   Discharge Equipment Recommendations: walker, rolling  Barriers to discharge: Ongoing medical needs    Assessment:     Mark Villar is a 84 y.o. male admitted with a medical diagnosis of  covid .  He presents with the following impairments/functional limitations: weakness, impaired endurance, impaired self care skills, impaired functional mobility, gait instability, impaired cardiopulmonary response to activity .    Rehab Prognosis: Good; patient would benefit from acute skilled PT services to address these deficits and reach maximum level of function.    Recent Surgery: * No surgery found *      Plan:     During this hospitalization, patient would benefit from acute PT services 3 x/week to address the identified rehab impairments via gait training, therapeutic activities, therapeutic exercises, neuromuscular re-education and progress toward the following goals:    Plan of Care Expires:  09/28/24    Subjective     Chief Complaint: none  Patient/Family Comments/goals:   Pain/Comfort:  Pain Rating 1: 0/10      Objective:     Communicated with [ts nurse prior to session.  Patient found HOB elevated with oxygen, peripheral IV, pulse ox (continuous), telemetry upon PT entry to room.     General Precautions: Standard, fall, contact, droplet  Orthopedic Precautions: N/A  Braces: N/A  Respiratory Status: Nasal cannula, flow 3 L/min  Nurse removed pt supplemental O2 decreasing to 87% at rest.  Nurse asked 2 L to be tried during session. Pt O2 levels decreased to 86% w/ activity.  Returned to 3L, NC.  Blood Pressure:   Skin Integrity: Visible skin intact      Functional Mobility:  Bed Mobility:     Scooting: modified independence  Supine to Sit: modified independence  Transfers:     Sit to Stand:  stand by assistance with no AD  Bed to Chair: stand by assistance  with  no AD  using  Step Transfer  Gait: The pt ambulated 200 ft w/o AD, CGA, demonstrating slow but steady gait.  SOB following.   Balance: no LOB during gait and t/f's.      Therapeutic Activities/Exercises:  Worked on balance both static and dynamic, narrow MEY EO/EC and multi directional stepping to improve balance reactions and trunk control.  Pt required SB to CGA w/ mild to moderate unsteadiness with pt self correcting w/ mild tactile cues.     Education:  Patient provided with verbal education and demonstrations education regarding PT role/goals/POC, fall prevention, and safety awareness.  Understanding was verbalized, however additional teaching warranted.     Patient left up in chair with all lines intact, call button in reach, and nurse notified    GOALS:   Multidisciplinary Problems       Physical Therapy Goals          Problem: Physical Therapy    Goal Priority Disciplines Outcome Goal Variances Interventions   Physical Therapy Goal     PT, PT/OT Progressing     Description: Goals to be met by: 2024     Patient will increase functional independence with mobility by performin. Supine to sit with [place]  2. Sit to supine with [place]  3. Sit to stand transfer with Modified [place]  4. Gait  x 150 feet with Modified C[place] using Rolling Walker or LRAD  5. Ascend/descend 3 stair with right Handrails Supervision using No Assistive Device.                          Time Tracking:     PT Received On: 24  PT Start Time: 09     PT Stop Time: 1035  PT Total Time (min): 39 min     Billable Minutes: Gait Training 15 and Therapeutic Activity 24    Treatment Type: Treatment  PT/PTA: PTA     Number of PTA visits since last PT visit: 2     2024

## 2024-08-30 NOTE — PLAN OF CARE
08/30/24 1114   Final Note   Assessment Type Final Discharge Note   Anticipated Discharge Disposition Home-Health   Post-Acute Status   Post-Acute Authorization Home Health;HME   HME Status Set-up Complete/Auth obtained  (Oxygen via [facility])   Home Health Status Set-up Complete/Auth obtained  ([facility] )     Spoke to patient's daughter Mayur regarding HH, she is requesting [facility]. Sent via [facility] and [facility] accepted.

## 2024-08-31 NOTE — DISCHARGE SUMMARY
[facility] Hospital Medicine Discharge Summary    Admit Date: 8/26/2024  Discharge Date and Time: 8/30/20247:41 PM  Admitting Physician: ADELA Team  Discharging Physician: Gita Barber DO.  Primary Care Physician: Jania, Primary Doctor  Consults: None    Discharge Diagnoses:  COVID 19 virus infection and pneumonia involving left lower lobe, POA  COPD with acute exacerbation   Acute hypoxic respiratory failure due to above   Left lung bronchiectasis   Hyponatremia , mild -POA  Metabolic acidosis , POA  Elevated transaminases , POA  Elevated inflammatory markers in the setting of COVID infection   Physical deconditioning/ debility      Hx-HTN, HLD, PAF on Eliquis, GERD, OA, Opiate dependency       Hospital Course:   The Pt is a 84-year-old male with a PMHx of paroxysmal AFib on Eliquis CAD/details not available, 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      Patient was in his usual state of health until about 1 week ago started having runny nose, nonproductive cough, fatigue, lethargy, decreased appetite.  He was seen by urgent care on 08/21/2024 and diagnosed with COVID. He  was prescribed doxycycline, prednisone, and Paxlovid. His symptoms did not improve and daughter checked his pulse ox today and was in the 80s for which he was brought to the ED. He denies fever, chills, chest pain, diarrhea.     On arrival to ED he was afebrile, tachycardic, tachypneic, normotensive and saturating 88% on room air, improved to above 94% on 4 L nasal cannula..  EKG showed atrial fibrillation with CVR. Labs with WBC 4.46, hemoglobin 16.0, platelets 158, creatinine 0.89, sodium 132, CRP 67.4, normal D-dimer, normal BNP and unremarkable troponin.  CTA chest with neg PE, left lower lobe bronchiectasis with  trace ground-glass opacity, mild tree in bud opacity suggestive of infectious process. No  masses, nodules or lymphadenopathy appreciated. Pt was admitted under  service for further management.     Patient received full course treatment of remdesivir and we will be discharged with Decadron to complete for 6 more days.  Home oxygen eval was done inpatient require 2-3 L of oxygen with exertion.  Patient denied any worsening symptoms at this time.  Labs and vitals remained stable.  Continued to have slight elevation of LFTs.  Hepatitis panel negative.  Liver ultrasound was obtained which showed cholelithiasis with no concern of cholecystitis and other concerns of liver disease.  We will have patient follow up closely with PCP outpatient for further workup.  Patient denied any nausea vomiting or abdominal pain during hospitalization.  Reviewed all meds and no hepatotoxic medications seen.  No other concerns at this time.  Patient will going home with daughter.      Pt was seen and examined on the day of discharge  Vitals:  VITAL SIGNS: 24 HRS MIN & MAX LAST   Temp  Min: 97.1 °F (36.2 °C)  Max: 97.5 °F (36.4 °C) 97.1 °F (36.2 °C)   BP  Min: 107/72  Max: 120/75 120/75   Pulse  Min: 64  Max: 86  67   Resp  Min: 18  Max: 18 18   SpO2  Min: 86 %  Max: 96 % 95 %       Physical Exam:  General: In no acute distress, afebrile, O2 via Oxy mask 2L  Chest: slightly coarse sounds anteriorly. Wheezing not appreciated today  Heart: RRR, +S1, S2, no appreciable murmur  Abdomen: Soft, nontender, BS +  MSK: Warm, no lower extremity edema, no clubbing or cyanosis  Neurologic:Awake and oriented     Procedures Performed: No admission procedures for hospital encounter.     Significant Diagnostic Studies: See Full reports for all details    Recent Labs   Lab 08/26/24 1752 08/28/24  0538   WBC 4.46* 7.11   RBC 4.45* 4.05*   HGB 16.0 14.4   HCT 44.3 39.6*   MCV 99.6* 97.8*   MCH 36.0* 35.6*   MCHC 36.1* 36.4*   RDW 12.7 12.3    153   MPV 10.7* 10.2       Recent Labs   Lab 08/26/24 1752 08/28/24  0538 08/29/24  0712  08/30/24  0714   * 133* 133* 133*   K 4.4 4.6 4.8 4.8    102 102 103   CO2 20* 21* 22* 21*   BUN 16.3 19.0 21.6 23.4   CREATININE 0.89 0.76 0.80 0.85   CALCIUM 9.4 8.2* 8.4* 8.4*   MG 1.70 2.20 2.10  --    ALBUMIN 3.5 2.9* 2.9* 2.8*   ALKPHOS 78 70 74 78   * 103* 152* 174*   AST 97* 82* 105* 96*   BILITOT 1.0 0.9 1.1 1.1        Microbiology Results (last 7 days)       ** No results found for the last 168 hours. **             US Abdomen Limited  Narrative: EXAMINATION:  US ABDOMEN LIMITED    CLINICAL HISTORY:  Elevated LFTs;    COMPARISON:  None    FINDINGS:  The visualized portions of the pancreas appear normal.  The liver is of normal size and shape.  Visibility is limited.  There is hepatopetal   flow in the portal vein.    There is cholelithiasis seen without evidence of acute cholecystitis.  The stone measures 1.9 cm.    The right kidney measures 11.4 x 5.5 by 4.8 cm there are no focal lesions noted.  There is no hydronephrosis.  Impression: Very limited study.    Cholelithiasis without evidence of acute cholecystitis    Electronically signed by: Ramesh Medrano MD  Date:    08/29/2024  Time:    17:17         Medication List        START taking these medications      benzonatate 200 MG capsule  Commonly known as: TESSALON  Take 1 capsule (200 mg total) by mouth 3 (three) times daily as needed for Cough ((2nd Choice)).     dexAMETHasone 6 MG tablet  Commonly known as: DECADRON  Take 1 tablet (6 mg total) by mouth once daily. for 6 days  Start taking on: August 31, 2024     guaiFENesin 600 mg 12 hr tablet  Commonly known as: MUCINEX  Take 1 tablet (600 mg total) by mouth 2 (two) times daily. for 7 days     pulse oximeter device  Commonly known as: pulse oximeter  by Apply Externally route 2 (two) times a day. Use twice daily at 8 AM and 3 PM and record the value in [employer] as directed.     tiotropium bromide 1.25 mcg/actuation inhaler  Commonly known as: SPIRIVA RESPIMAT  Inhale 2 puffs into the  lungs once daily. Controller            CONTINUE taking these medications      ELIQUIS 5 mg Tab  Generic drug: apixaban     HYDROcodone-acetaminophen  mg per tablet  Commonly known as: NORCO     metoprolol succinate 100 MG 24 hr tablet  Commonly known as: TOPROL-XL     VENTOLIN HFA 90 mcg/actuation inhaler  Generic drug: albuterol            STOP taking these medications      doxycycline 100 MG tablet  Commonly known as: VIBRA-TABS     predniSONE 20 MG tablet  Commonly known as: DELTASONE               Where to Get Your Medications        These medications were sent to [facility] - [place], [place] - [address]  [address], [facility] [address]      Phone: [phone]   benzonatate 200 MG capsule  dexAMETHasone 6 MG tablet  guaiFENesin 600 mg 12 hr tablet  tiotropium bromide 1.25 mcg/actuation inhaler       Information about where to get these medications is not yet available    Ask your nurse or doctor about these medications  pulse oximeter device          Explained in detail to the patient about the discharge plan, medications, and follow-up visits. Pt understands and agrees with the treatment plan  Discharge Disposition: Home-Health Care [facility]   Discharged Condition: stable  Diet-    Medications Per DC med rec  Activities as tolerated   Contact information for follow-up providers       [facility] Phone: [phone] Follow up.    Contact information:  [address], [address] [place] [address]             Svitlana Martin MD Follow up.    Specialty: Internal Medicine  Why: Appointment is scheduled for September 18th at 10:40AM, please bring insurance and ID cards, along with any current medications you are taking.  Contact information:  [address] [place] [address] [phone]                       Contact information for after-discharge care       Durable Medical Equipment       [facility]  [place] .    Service: Durable Medical Equipment  Contact information:  Desi Dixon [facility] [id number] [phone]                                 For further questions contact hospitalist office    Discharge time 33 minutes    For worsening symptoms, chest pain, shortness of breath, increased abdominal pain, high grade fever, stroke or stroke like symptoms, immediately go to the nearest Emergency Room or call 911 as soon as possible.      Gita Barber DO  Department of Hospital Medicine  [facility]  08/30/2024

## 2024-09-06 ENCOUNTER — PATIENT OUTREACH (OUTPATIENT)
Dept: ADMINISTRATIVE | Facility: CLINIC | Age: 84
End: 2024-09-06
Payer: MEDICARE

## 2024-09-06 NOTE — PROGRESS NOTES
C3 nurse attempted to contact Mark Villar  for a [facility] post hospital discharge follow up call. No answer. Left voicemail with callback information. The patient has a scheduled HOSFU appointment with Svitlana Martin MD (Internal Medicine); on 9/18/24 at 10:40AM

## 2025-07-30 DIAGNOSIS — K80.20 CALCULUS OF GALLBLADDER WITHOUT CHOLECYSTITIS WITHOUT OBSTRUCTION: Primary | ICD-10-CM

## 2025-08-19 DIAGNOSIS — K80.20 CHOLELITHIASIS: Primary | ICD-10-CM
